# Patient Record
Sex: FEMALE | Race: WHITE | Employment: FULL TIME | URBAN - METROPOLITAN AREA
[De-identification: names, ages, dates, MRNs, and addresses within clinical notes are randomized per-mention and may not be internally consistent; named-entity substitution may affect disease eponyms.]

---

## 2017-01-23 ENCOUNTER — TELEPHONE (OUTPATIENT)
Dept: OBGYN | Facility: OTHER | Age: 37
End: 2017-01-23

## 2017-01-23 NOTE — TELEPHONE ENCOUNTER
Patient was being seen at [facility] for fertility and they confirmed her pregnancy with ultra sound and have released her to see her own OB provider now, patient will be 9 weeks on Wednesday and she is not sure when she should be seen?    Thank you Keshia

## 2017-01-30 ENCOUNTER — PRENATAL OFFICE VISIT (OUTPATIENT)
Dept: OBGYN | Facility: OTHER | Age: 37
End: 2017-01-30
Payer: COMMERCIAL

## 2017-01-30 VITALS
HEART RATE: 78 BPM | WEIGHT: 189 LBS | DIASTOLIC BLOOD PRESSURE: 60 MMHG | SYSTOLIC BLOOD PRESSURE: 84 MMHG | BODY MASS INDEX: 26.37 KG/M2

## 2017-01-30 DIAGNOSIS — Z34.91 NORMAL PREGNANCY IN FIRST TRIMESTER: Primary | ICD-10-CM

## 2017-01-30 PROBLEM — Z23 FLU VACCINE NEED: Status: ACTIVE | Noted: 2017-01-30

## 2017-01-30 PROBLEM — Z98.891 H/O: CESAREAN SECTION: Status: ACTIVE | Noted: 2017-01-30

## 2017-01-30 PROBLEM — O34.219 DECLINES VBAC (VAGINAL BIRTH AFTER CESAREAN) TRIAL: Status: ACTIVE | Noted: 2017-01-30

## 2017-01-30 PROBLEM — Z23 NEED FOR TDAP VACCINATION: Status: ACTIVE | Noted: 2017-01-30

## 2017-01-30 LAB
ALBUMIN UR-MCNC: NEGATIVE MG/DL
APPEARANCE UR: CLEAR
BASOPHILS # BLD AUTO: 0 10E9/L (ref 0–0.2)
BASOPHILS NFR BLD AUTO: 0.1 %
BILIRUB UR QL STRIP: NEGATIVE
COLOR UR AUTO: YELLOW
DIFFERENTIAL METHOD BLD: NORMAL
EOSINOPHIL # BLD AUTO: 0.1 10E9/L (ref 0–0.7)
EOSINOPHIL NFR BLD AUTO: 0.6 %
ERYTHROCYTE [DISTWIDTH] IN BLOOD BY AUTOMATED COUNT: 12.8 % (ref 10–15)
GLUCOSE UR STRIP-MCNC: NEGATIVE MG/DL
HCT VFR BLD AUTO: 37.9 % (ref 35–47)
HGB BLD-MCNC: 13.2 G/DL (ref 11.7–15.7)
HGB UR QL STRIP: ABNORMAL
KETONES UR STRIP-MCNC: NEGATIVE MG/DL
LEUKOCYTE ESTERASE UR QL STRIP: NEGATIVE
LYMPHOCYTES # BLD AUTO: 2.4 10E9/L (ref 0.8–5.3)
LYMPHOCYTES NFR BLD AUTO: 26.8 %
MCH RBC QN AUTO: 30.5 PG (ref 26.5–33)
MCHC RBC AUTO-ENTMCNC: 34.8 G/DL (ref 31.5–36.5)
MCV RBC AUTO: 88 FL (ref 78–100)
MONOCYTES # BLD AUTO: 0.4 10E9/L (ref 0–1.3)
MONOCYTES NFR BLD AUTO: 4.4 %
NEUTROPHILS # BLD AUTO: 6 10E9/L (ref 1.6–8.3)
NEUTROPHILS NFR BLD AUTO: 68.1 %
NITRATE UR QL: NEGATIVE
NON-SQ EPI CELLS #/AREA URNS LPF: ABNORMAL /LPF
PH UR STRIP: 6 PH (ref 5–7)
PLATELET # BLD AUTO: 205 10E9/L (ref 150–450)
RBC # BLD AUTO: 4.33 10E12/L (ref 3.8–5.2)
RBC #/AREA URNS AUTO: ABNORMAL /HPF (ref 0–2)
SP GR UR STRIP: 1.01 (ref 1–1.03)
URN SPEC COLLECT METH UR: ABNORMAL
UROBILINOGEN UR STRIP-ACNC: 0.2 EU/DL (ref 0.2–1)
WBC # BLD AUTO: 8.8 10E9/L (ref 4–11)
WBC #/AREA URNS AUTO: ABNORMAL /HPF (ref 0–2)

## 2017-01-30 PROCEDURE — 36415 COLL VENOUS BLD VENIPUNCTURE: CPT | Performed by: OBSTETRICS & GYNECOLOGY

## 2017-01-30 PROCEDURE — 87389 HIV-1 AG W/HIV-1&-2 AB AG IA: CPT | Performed by: OBSTETRICS & GYNECOLOGY

## 2017-01-30 PROCEDURE — 87624 HPV HI-RISK TYP POOLED RSLT: CPT | Performed by: OBSTETRICS & GYNECOLOGY

## 2017-01-30 PROCEDURE — 86780 TREPONEMA PALLIDUM: CPT | Performed by: OBSTETRICS & GYNECOLOGY

## 2017-01-30 PROCEDURE — 87340 HEPATITIS B SURFACE AG IA: CPT | Performed by: OBSTETRICS & GYNECOLOGY

## 2017-01-30 PROCEDURE — 86900 BLOOD TYPING SEROLOGIC ABO: CPT | Performed by: OBSTETRICS & GYNECOLOGY

## 2017-01-30 PROCEDURE — 86762 RUBELLA ANTIBODY: CPT | Performed by: OBSTETRICS & GYNECOLOGY

## 2017-01-30 PROCEDURE — G0145 SCR C/V CYTO,THINLAYER,RESCR: HCPCS | Performed by: OBSTETRICS & GYNECOLOGY

## 2017-01-30 PROCEDURE — 99207 ZZC FIRST OB VISIT: CPT | Performed by: OBSTETRICS & GYNECOLOGY

## 2017-01-30 PROCEDURE — 87591 N.GONORRHOEAE DNA AMP PROB: CPT | Performed by: OBSTETRICS & GYNECOLOGY

## 2017-01-30 PROCEDURE — 85025 COMPLETE CBC W/AUTO DIFF WBC: CPT | Performed by: OBSTETRICS & GYNECOLOGY

## 2017-01-30 PROCEDURE — 86850 RBC ANTIBODY SCREEN: CPT | Performed by: OBSTETRICS & GYNECOLOGY

## 2017-01-30 PROCEDURE — 81001 URINALYSIS AUTO W/SCOPE: CPT | Performed by: OBSTETRICS & GYNECOLOGY

## 2017-01-30 PROCEDURE — 86901 BLOOD TYPING SEROLOGIC RH(D): CPT | Performed by: OBSTETRICS & GYNECOLOGY

## 2017-01-30 PROCEDURE — 87491 CHLMYD TRACH DNA AMP PROBE: CPT | Performed by: OBSTETRICS & GYNECOLOGY

## 2017-01-30 ASSESSMENT — PAIN SCALES - GENERAL: PAINLEVEL: NO PAIN (0)

## 2017-01-30 NOTE — PROGRESS NOTES
"Chief Complaint   Patient presents with     Prenatal Care     folder - getting infertitlity records from [place] and Ultrasound/  Pap is due       Initial BP 84/60 mmHg  Pulse 78  Wt 189 lb (85.73 kg) Estimated body mass index is 26.37 kg/(m^2) as calculated from the following:    Height as of 3/27/14: 5' 11\" (1.803 m).    Weight as of this encounter: 189 lb (85.73 kg).  BP completed using cuff size: large  "
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
## 2017-01-31 LAB
ABO + RH BLD: NORMAL
ABO + RH BLD: NORMAL
BLD GP AB SCN SERPL QL: NORMAL
BLOOD BANK CMNT PATIENT-IMP: NORMAL
HBV SURFACE AG SERPL QL IA: NONREACTIVE
HIV 1+2 AB+HIV1 P24 AG SERPL QL IA: NORMAL
SPECIMEN EXP DATE BLD: NORMAL
T PALLIDUM IGG+IGM SER QL: NEGATIVE

## 2017-02-01 LAB
C TRACH DNA SPEC QL NAA+PROBE: NORMAL
N GONORRHOEA DNA SPEC QL NAA+PROBE: NORMAL
RUBV IGG SERPL IA-ACNC: 32 IU/ML
SPECIMEN SOURCE: NORMAL
SPECIMEN SOURCE: NORMAL

## 2017-02-02 LAB
COPATH REPORT: NORMAL
PAP: NORMAL

## 2017-02-06 LAB
FINAL DIAGNOSIS: NORMAL
HPV HR 12 DNA CVX QL NAA+PROBE: NEGATIVE
HPV16 DNA SPEC QL NAA+PROBE: NEGATIVE
HPV18 DNA SPEC QL NAA+PROBE: NEGATIVE
SPECIMEN DESCRIPTION: NORMAL

## 2017-02-10 ENCOUNTER — RADIANT APPOINTMENT (OUTPATIENT)
Dept: ULTRASOUND IMAGING | Facility: OTHER | Age: 37
End: 2017-02-10
Attending: OBSTETRICS & GYNECOLOGY
Payer: COMMERCIAL

## 2017-02-10 DIAGNOSIS — Z34.91 NORMAL PREGNANCY IN FIRST TRIMESTER: ICD-10-CM

## 2017-02-10 PROCEDURE — 76801 OB US < 14 WKS SINGLE FETUS: CPT

## 2017-03-03 ENCOUNTER — PRENATAL OFFICE VISIT (OUTPATIENT)
Dept: OBGYN | Facility: OTHER | Age: 37
End: 2017-03-03
Payer: COMMERCIAL

## 2017-03-03 VITALS
HEART RATE: 66 BPM | WEIGHT: 199 LBS | SYSTOLIC BLOOD PRESSURE: 98 MMHG | BODY MASS INDEX: 27.75 KG/M2 | DIASTOLIC BLOOD PRESSURE: 52 MMHG

## 2017-03-03 DIAGNOSIS — O34.219 DECLINES VBAC (VAGINAL BIRTH AFTER CESAREAN) TRIAL: ICD-10-CM

## 2017-03-03 DIAGNOSIS — Z34.82 NORMAL PREGNANCY IN MULTIGRAVIDA IN SECOND TRIMESTER: Primary | ICD-10-CM

## 2017-03-03 DIAGNOSIS — Z98.891 H/O: CESAREAN SECTION: ICD-10-CM

## 2017-03-03 PROCEDURE — 99207 ZZC PRENATAL VISIT: CPT | Performed by: OBSTETRICS & GYNECOLOGY

## 2017-03-03 ASSESSMENT — PAIN SCALES - GENERAL: PAINLEVEL: NO PAIN (0)

## 2017-03-03 NOTE — NURSING NOTE
"Chief Complaint   Patient presents with     Prenatal Care     14 plus 2 weeks       Initial BP 98/52  Pulse 66  Wt 199 lb (90.3 kg)  BMI 27.75 kg/m2 Estimated body mass index is 27.75 kg/(m^2) as calculated from the following:    Height as of 3/27/14: 5' 11\" (1.803 m).    Weight as of this encounter: 199 lb (90.3 kg).  Medication Reconciliation: complete     14w2d    "

## 2017-03-03 NOTE — PROGRESS NOTES
36 year old  at 14w2d weeks presents to the clinic for a routine prenatal visit.  Feeling well.  No vaginal bleeding, leakage of fluid, or contractions   Fundal height=s=d  GDXg=446's  Discussed quad screen and she declines  Anatomy ultrasound already scheduled with M for 3/31  RTC 4 weeks.    Kirstie Shields

## 2017-03-03 NOTE — MR AVS SNAPSHOT
After Visit Summary   3/3/2017    Liliana Guerar    MRN: [id number]           Patient Information     Date Of Birth          1980        Visit Information        Provider Department      3/3/2017 4:00 PM Kirstie Shields DO [facility]        Today's Diagnoses     Normal pregnancy in multigravida in second trimester    -  1    H/O:  section        Declines  (vaginal birth after ) trial          Care Instructions    Prenatal Care  What is prenatal care?   Prenatal care is the care you receive when you are pregnant. It includes care given by your healthcare provider, support from your family, and an extra focus on giving yourself the care you need during this special time. Prenatal care improves your chances for a healthy pregnancy and healthy baby.   When should I see my healthcare provider?   Good care during pregnancy includes regularly scheduled prenatal exams.   If you are not yet pregnant but planning to get pregnant in the next few months, see your provider. Your provider may do some tests and talk about things you can do to have a healthy pregnancy and healthy baby.   You should schedule your first prenatal visit with your provider as soon as you think or know you are pregnant. Depending on your health and health history, your provider will probably schedule visits at least once a month for the first 6 months. During the 7th and 8th months you will see your provider every 2 weeks. During the last month you will probably see your provider once a week until you deliver your baby. If your pregnancy is high risk, your provider will probably want to see you more often. In some cases your provider may refer you to a medical specialist for more help with special needs, such as diabetes.   At each visit your healthcare provider will check to make sure that you and the baby are healthy. Regular visits can help you and your provider prevent possible problems.  They can also help your provider find and treat any problems early. In addition to meeting your medical needs, your provider advise you about caring for yourself. You will talk about how to have a healthy diet and get plenty of exercise and rest. Your provider can also help you deal with the emotional changes that can happen during pregnancy.   What will happen at the first prenatal visit?   At your first visit, your provider will ask about your personal medical history. He or she will also ask about the baby's father and your family health history. This information can help give your provider an idea of any problems you might have during your pregnancy. You will have a physical exam, including checks of your height, weight, and blood pressure and a pelvic exam. You will have a Pap test, urine tests, blood tests, and cultures of the cervix and vagina to check for infection.   Your provider will calculate your due date and the age of your baby. If your periods were regular before you got pregnant, and you are sure of the day when your last period started, your due date will be estimated to be 40 weeks from that day.   Your provider will talk to you about how to stay healthy during your pregnancy.   What will happen at other prenatal visits?   Your provider will check how you are doing and how the baby is developing. He or she will discuss how you are feeling, ask if you have any problems, and answer your questions.   During each prenatal visit your provider will:   weigh you   take your blood pressure   check your urine for sugar, protein, or bacteria   check your face, hands, ankles, and feet for swelling   listen to the baby's heartbeat   measure the size of the uterus to check the baby's growth.   At different times during the pregnancy, other exams and tests may be done. Some are routine and others are done only when a problem is suspected or you have a risk factor for a problem. Examples of other tests you might  have are:   tests to check for genetic problems and some birth defects, such as:   chorionic villus sampling of cells from the placenta   amniocentesis to test the fluid around the baby   blood tests called triple or quad screens   ultrasound scans to check the baby's growth, development, and health and to look at your uterus, the amniotic sac, and the placenta   blood tests to check for diabetes   electronic monitoring to check the health of the baby.   How can I take care of myself during my pregnancy?   Here are some things you can do to take good care of yourself during your pregnancy and prepare for the birth of your child:   Keep all appointments with your healthcare provider. Use these visits to discuss your pregnancy concerns or problems. Write down questions before each visit so that you will not forget about things you want to talk about.   Eat healthy meals that include whole grains, fresh fruits and vegetables, and calcium-rich foods, such as milk, cheese, and yogurt. Choose foods low in saturated fat. Do not eat uncooked or undercooked meats or fish.   Avoid certain fish with high levels of mercury. These fish include shark, sophie mackerel, swordfish, and tilefish. Do not eat more than 12 ounces of fish per week, or no more than 6 ounces of tuna fish per week. Because albacore tuna fish is also high in mercury, choose light tuna.   Drink plenty of water each day.   Take vitamins, other supplements, and medicines as recommended by your provider.   Unless your healthcare provider tells you not to, try to be physically active for at least 30 minutes a day, most days of the week. If you are pressed for time, you might find it easier to exercise 10 minutes at a time, 3 times a day. Consider taking a prenatal exercise class.   Do not smoke, do not drink alcohol, and do not take illegal drugs.   Talk to your healthcare provider before you take any medicine, including nonprescription and herbal medicines. Some  medicines are not safe during pregnancy.   Avoid hot tubs or saunas.   If you have cats in your home, do not empty the cat litter while you are pregnant. It may contain a parasite that causes an infection called toxoplasmosis, which can cause birth defects. Also, use gloves when you work in garden areas used by cats.   Stay away from toxic chemicals like insecticides, solvents (such as some  or paint thinners), lead, and mercury. Check labels on household products. Most dangerous products have pregnancy warnings on their labels. Ask your healthcare provider about products if you are unsure.   Relax by taking breaks from work or chores.   Help reduce stress by sharing your feelings with others.   Report any violence or other types of abuse in your home.   Learn more about pregnancy, labor, and delivery. Read books, watch videos, go to a childbirth class, and talk with experienced moms.   Plan for the lifestyle changes a new baby will bring. Prepare for possible changes in your budget, work situation, daily schedule, and relationships with family and friends.   Talk to your provider about the pros and cons of breast-feeding.   Before and during your pregnancy, try to do everything you can to keep yourself and your baby healthy during your pregnancy.     Published by [employer].  This content is reviewed periodically and is subject to change as new health information becomes available. The information is intended to inform and educate and is not a replacement for medical evaluation, advice, diagnosis or treatment by a healthcare professional.   Developed by [employer].   ? 2010 [facility] and/or its affiliates. All Rights Reserved.   Copyright   Clinical Reference Systems 2011            Follow-ups after your visit        Follow-up notes from your care team     Return in about 4 weeks (around 3/31/2017).      Who to contact     If you have questions or need follow up information about today's clinic visit or  your schedule please contact [facility] directly at [phone].  Normal or non-critical lab and imaging results will be communicated to you by MyChart, letter or phone within 4 business days after the clinic has received the results. If you do not hear from us within 7 days, please contact the clinic through [employer] or phone. If you have a critical or abnormal lab result, we will notify you by phone as soon as possible.  Submit refill requests through [employer] or call your pharmacy and they will forward the refill request to us. Please allow 3 business days for your refill to be completed.          Additional Information About Your Visit        [employer] Information     [employer] gives you secure access to your electronic health record. If you see a primary care provider, you can also send messages to your care team and make appointments. If you have questions, please call your primary care clinic.  If you do not have a primary care provider, please call [phone] and they will assist you.        Care EveryWhere ID     This is your Care EveryWhere ID. This could be used by other organizations to access your [facility] medical records  [id number]        Your Vitals Were     Pulse BMI (Body Mass Index)                66 27.75 kg/m2           Blood Pressure from Last 3 Encounters:   03/03/17 98/52   01/30/17 (!) 84/60   03/05/15 119/73    Weight from Last 3 Encounters:   03/03/17 199 lb (90.3 kg)   01/30/17 189 lb (85.7 kg)   03/05/15 205 lb 6.4 oz (93.2 kg)              Today, you had the following     No orders found for display       Primary Care Provider    None Specified       No primary provider on file.        Thank you!     Thank you for choosing [facility]  for your care. Our goal is always to provide you with excellent care. Hearing back from our patients is one way we can continue to improve our services. Please take a few minutes to complete the written survey that you may  receive in the mail after your visit with us. Thank you!             Your Updated Medication List - Protect others around you: Learn how to safely use, store and throw away your medicines at www.disposemymeds.org.          This list is accurate as of: 3/3/17  4:40 PM.  Always use your most recent med list.                   Brand Name Dispense Instructions for use    PRENATAL AD PO      Take 1 tablet by mouth

## 2017-03-03 NOTE — PATIENT INSTRUCTIONS
Prenatal Care  What is prenatal care?   Prenatal care is the care you receive when you are pregnant. It includes care given by your healthcare provider, support from your family, and an extra focus on giving yourself the care you need during this special time. Prenatal care improves your chances for a healthy pregnancy and healthy baby.   When should I see my healthcare provider?   Good care during pregnancy includes regularly scheduled prenatal exams.   If you are not yet pregnant but planning to get pregnant in the next few months, see your provider. Your provider may do some tests and talk about things you can do to have a healthy pregnancy and healthy baby.   You should schedule your first prenatal visit with your provider as soon as you think or know you are pregnant. Depending on your health and health history, your provider will probably schedule visits at least once a month for the first 6 months. During the 7th and 8th months you will see your provider every 2 weeks. During the last month you will probably see your provider once a week until you deliver your baby. If your pregnancy is high risk, your provider will probably want to see you more often. In some cases your provider may refer you to a medical specialist for more help with special needs, such as diabetes.   At each visit your healthcare provider will check to make sure that you and the baby are healthy. Regular visits can help you and your provider prevent possible problems. They can also help your provider find and treat any problems early. In addition to meeting your medical needs, your provider advise you about caring for yourself. You will talk about how to have a healthy diet and get plenty of exercise and rest. Your provider can also help you deal with the emotional changes that can happen during pregnancy.   What will happen at the first prenatal visit?   At your first visit, your provider will ask about your personal medical history. He  or she will also ask about the baby's father and your family health history. This information can help give your provider an idea of any problems you might have during your pregnancy. You will have a physical exam, including checks of your height, weight, and blood pressure and a pelvic exam. You will have a Pap test, urine tests, blood tests, and cultures of the cervix and vagina to check for infection.   Your provider will calculate your due date and the age of your baby. If your periods were regular before you got pregnant, and you are sure of the day when your last period started, your due date will be estimated to be 40 weeks from that day.   Your provider will talk to you about how to stay healthy during your pregnancy.   What will happen at other prenatal visits?   Your provider will check how you are doing and how the baby is developing. He or she will discuss how you are feeling, ask if you have any problems, and answer your questions.   During each prenatal visit your provider will:   weigh you   take your blood pressure   check your urine for sugar, protein, or bacteria   check your face, hands, ankles, and feet for swelling   listen to the baby's heartbeat   measure the size of the uterus to check the baby's growth.   At different times during the pregnancy, other exams and tests may be done. Some are routine and others are done only when a problem is suspected or you have a risk factor for a problem. Examples of other tests you might have are:   tests to check for genetic problems and some birth defects, such as:   chorionic villus sampling of cells from the placenta   amniocentesis to test the fluid around the baby   blood tests called triple or quad screens   ultrasound scans to check the baby's growth, development, and health and to look at your uterus, the amniotic sac, and the placenta   blood tests to check for diabetes   electronic monitoring to check the health of the baby.   How can I take care  of myself during my pregnancy?   Here are some things you can do to take good care of yourself during your pregnancy and prepare for the birth of your child:   Keep all appointments with your healthcare provider. Use these visits to discuss your pregnancy concerns or problems. Write down questions before each visit so that you will not forget about things you want to talk about.   Eat healthy meals that include whole grains, fresh fruits and vegetables, and calcium-rich foods, such as milk, cheese, and yogurt. Choose foods low in saturated fat. Do not eat uncooked or undercooked meats or fish.   Avoid certain fish with high levels of mercury. These fish include shark, sophie mackerel, swordfish, and tilefish. Do not eat more than 12 ounces of fish per week, or no more than 6 ounces of tuna fish per week. Because albacore tuna fish is also high in mercury, choose light tuna.   Drink plenty of water each day.   Take vitamins, other supplements, and medicines as recommended by your provider.   Unless your healthcare provider tells you not to, try to be physically active for at least 30 minutes a day, most days of the week. If you are pressed for time, you might find it easier to exercise 10 minutes at a time, 3 times a day. Consider taking a prenatal exercise class.   Do not smoke, do not drink alcohol, and do not take illegal drugs.   Talk to your healthcare provider before you take any medicine, including nonprescription and herbal medicines. Some medicines are not safe during pregnancy.   Avoid hot tubs or saunas.   If you have cats in your home, do not empty the cat litter while you are pregnant. It may contain a parasite that causes an infection called toxoplasmosis, which can cause birth defects. Also, use gloves when you work in garden areas used by cats.   Stay away from toxic chemicals like insecticides, solvents (such as some  or paint thinners), lead, and mercury. Check labels on household products.  Most dangerous products have pregnancy warnings on their labels. Ask your healthcare provider about products if you are unsure.   Relax by taking breaks from work or chores.   Help reduce stress by sharing your feelings with others.   Report any violence or other types of abuse in your home.   Learn more about pregnancy, labor, and delivery. Read books, watch videos, go to a childbirth class, and talk with experienced moms.   Plan for the lifestyle changes a new baby will bring. Prepare for possible changes in your budget, work situation, daily schedule, and relationships with family and friends.   Talk to your provider about the pros and cons of breast-feeding.   Before and during your pregnancy, try to do everything you can to keep yourself and your baby healthy during your pregnancy.     Published by [employer].  This content is reviewed periodically and is subject to change as new health information becomes available. The information is intended to inform and educate and is not a replacement for medical evaluation, advice, diagnosis or treatment by a healthcare professional.   Developed by [employer].   ? 2010 [employer] and/or its affiliates. All Rights Reserved.   Copyright   Clinical Reference Systems 2011

## 2017-03-31 ENCOUNTER — TRANSFERRED RECORDS (OUTPATIENT)
Dept: HEALTH INFORMATION MANAGEMENT | Facility: CLINIC | Age: 37
End: 2017-03-31

## 2017-04-05 PROBLEM — O09.522 ELDERLY MULTIGRAVIDA IN SECOND TRIMESTER: Status: ACTIVE | Noted: 2017-04-05

## 2017-04-18 ENCOUNTER — PRENATAL OFFICE VISIT (OUTPATIENT)
Dept: OBGYN | Facility: OTHER | Age: 37
End: 2017-04-18
Payer: COMMERCIAL

## 2017-04-18 VITALS
WEIGHT: 204.25 LBS | BODY MASS INDEX: 28.49 KG/M2 | SYSTOLIC BLOOD PRESSURE: 100 MMHG | HEART RATE: 68 BPM | DIASTOLIC BLOOD PRESSURE: 58 MMHG

## 2017-04-18 DIAGNOSIS — Z98.891 H/O: CESAREAN SECTION: ICD-10-CM

## 2017-04-18 DIAGNOSIS — Z23 NEED FOR TDAP VACCINATION: ICD-10-CM

## 2017-04-18 DIAGNOSIS — O34.219 DECLINES VBAC (VAGINAL BIRTH AFTER CESAREAN) TRIAL: ICD-10-CM

## 2017-04-18 DIAGNOSIS — O09.522 ELDERLY MULTIGRAVIDA IN SECOND TRIMESTER: ICD-10-CM

## 2017-04-18 PROCEDURE — 99207 ZZC PRENATAL VISIT: CPT | Performed by: ADVANCED PRACTICE MIDWIFE

## 2017-04-18 NOTE — PATIENT INSTRUCTIONS
GESTATIONAL DIABETES SCREENING    All pregnant women are screened at least once for diabetes as part of their prenatal care.  A woman has gestational diabetes if she has high blood sugars for the first time during pregnancy.  Diabetes can harm your health and the health of your baby.  But if we find the diabetes early in pregnancy and we watch your health closely, we can prevent problems.   We will check for diabetes between your 24 and 28 week visit.   Please note you can not do this prior to 24 weeks of gestation.    Please schedule this test between 8 AM and 11 AM or 1 PM and 3:45 PM.  On Monday and Thursday you may schedule your appointment up to 5:45PM in [place] and on Monday's in [place] until 5:45 PM    Plan to spend an hour at the clinic.  When you check in let us know that you will be having your diabetes screening that day.   We will give you a sweet drink and one hour later will draw blood from your arm to check your blood sugar.   We will call you to let you know if your results are normal.  If the results are normal no more testing will be needed.  If your results are not normal we will discuss follow up testing with you.    You may eat prior to the testing but it is not recommended to eat or drink very sweet things such as pop, juice, candy or dessert type foods.   If you have any questions please call:  [place] [phone]      SIGNS OF  LABOR    Labor is  if it happens more than three weeks before your due date.    It can be hard to know if you are in labor, since the symptoms can be like the normal feelings of pregnancy.  Often, the only difference is the symptoms increase or they don't go away.     Signs of  labor can include:    Change in your vaginal discharge:  You will have more vaginal discharge when you are pregnant and it should be creamy white.  Call the clinic right away if your discharge has a foul odor, pink or bloody,  or if it becomes watery or is more than  is normal for you during your pregnancy.    More than 5-6 contractions or tightenings per hour.  Contractions can feel like period cramps or bowel (gas or diarrhea) pain.  You will feel it in the lower part of your abdomen, in your back or as a pressure feeling in your bottom.  It is often regular, coming for 30 seconds or a minute and then going away, only to come back 5 or 10 minutes later. Some contractions are normal during pregnancy, but if you are feeling more than 5-6 in one hour, empty your bladder, then drink 16-24 ounces of water, eat a snack and lay down on your left side. Put your hand on your abdomen to count the contractions.  If after one hour of resting you have still had 5-6 contractions call your clinic.

## 2017-04-18 NOTE — NURSING NOTE
"Chief Complaint   Patient presents with     Prenatal Care       Initial /58 (BP Location: Left arm, Patient Position: Chair, Cuff Size: Adult Regular)  Pulse 68  Wt 204 lb 4 oz (92.6 kg)  BMI 28.49 kg/m2 Estimated body mass index is 28.49 kg/(m^2) as calculated from the following:    Height as of 3/27/14: 5' 11\" (1.803 m).    Weight as of this encounter: 204 lb 4 oz (92.6 kg).  Medication Reconciliation: complete   Loren Mann CMA      "

## 2017-04-18 NOTE — PROGRESS NOTES
Feeling well other than a couple of musculo-skeletal complaints.   Reviewed GCT for next visit.   Discussed PTL.  Will breast feed.   Has echo scheduled.   RTC 4 weeks.  Mary Astorga, MAURIZIO, CNM

## 2017-04-18 NOTE — MR AVS SNAPSHOT
After Visit Summary   2017    Liliana Guerra    MRN: [id number]           Patient Information     Date Of Birth          1980        Visit Information        Provider Department      2017 8:15 AM Mary Mendez APRN CNM [facility]        Today's Diagnoses     Elderly multigravida in second trimester        Need for Tdap vaccination        H/O:  section        Declines  (vaginal birth after ) trial          Care Instructions    GESTATIONAL DIABETES SCREENING    All pregnant women are screened at least once for diabetes as part of their prenatal care.  A woman has gestational diabetes if she has high blood sugars for the first time during pregnancy.  Diabetes can harm your health and the health of your baby.  But if we find the diabetes early in pregnancy and we watch your health closely, we can prevent problems.   We will check for diabetes between your 24 and 28 week visit.   Please note you can not do this prior to 24 weeks of gestation.    Please schedule this test between 8 AM and 11 AM or 1 PM and 3:45 PM.  On Monday and Thursday you may schedule your appointment up to 5:45PM in [place] and on  in [place] until 5:45 PM    Plan to spend an hour at the clinic.  When you check in let us know that you will be having your diabetes screening that day.   We will give you a sweet drink and one hour later will draw blood from your arm to check your blood sugar.   We will call you to let you know if your results are normal.  If the results are normal no more testing will be needed.  If your results are not normal we will discuss follow up testing with you.    You may eat prior to the testing but it is not recommended to eat or drink very sweet things such as pop, juice, candy or dessert type foods.   If you have any questions please call:  [place] [phone]      SIGNS OF  LABOR    Labor is  if it happens more than three  weeks before your due date.    It can be hard to know if you are in labor, since the symptoms can be like the normal feelings of pregnancy.  Often, the only difference is the symptoms increase or they don't go away.     Signs of  labor can include:    Change in your vaginal discharge:  You will have more vaginal discharge when you are pregnant and it should be creamy white.  Call the clinic right away if your discharge has a foul odor, pink or bloody,  or if it becomes watery or is more than is normal for you during your pregnancy.    More than 5-6 contractions or tightenings per hour.  Contractions can feel like period cramps or bowel (gas or diarrhea) pain.  You will feel it in the lower part of your abdomen, in your back or as a pressure feeling in your bottom.  It is often regular, coming for 30 seconds or a minute and then going away, only to come back 5 or 10 minutes later. Some contractions are normal during pregnancy, but if you are feeling more than 5-6 in one hour, empty your bladder, then drink 16-24 ounces of water, eat a snack and lay down on your left side. Put your hand on your abdomen to count the contractions.  If after one hour of resting you have still had 5-6 contractions call your clinic.              Follow-ups after your visit        Follow-up notes from your care team     Return in about 1 month (around 2017).      Who to contact     If you have questions or need follow up information about today's clinic visit or your schedule please contact [facility] directly at [phone].  Normal or non-critical lab and imaging results will be communicated to you by MyChart, letter or phone within 4 business days after the clinic has received the results. If you do not hear from us within 7 days, please contact the clinic through MyChart or phone. If you have a critical or abnormal lab result, we will notify you by phone as soon as possible.  Submit refill requests through  [employer] or call your pharmacy and they will forward the refill request to us. Please allow 3 business days for your refill to be completed.          Additional Information About Your Visit        [employer] Information     Rollad gives you secure access to your electronic health record. If you see a primary care provider, you can also send messages to your care team and make appointments. If you have questions, please call your primary care clinic.  If you do not have a primary care provider, please call [phone] and they will assist you.        Care EveryWhere ID     This is your Care EveryWhere ID. This could be used by other organizations to access your [place] medical records  [id number]        Your Vitals Were     Pulse BMI (Body Mass Index)                68 28.49 kg/m2           Blood Pressure from Last 3 Encounters:   04/18/17 100/58   03/03/17 98/52   01/30/17 (!) 84/60    Weight from Last 3 Encounters:   04/18/17 204 lb 4 oz (92.6 kg)   03/03/17 199 lb (90.3 kg)   01/30/17 189 lb (85.7 kg)              Today, you had the following     No orders found for display       Primary Care Provider    None Specified       No primary provider on file.        Thank you!     Thank you for choosing [facility]  for your care. Our goal is always to provide you with excellent care. Hearing back from our patients is one way we can continue to improve our services. Please take a few minutes to complete the written survey that you may receive in the mail after your visit with us. Thank you!             Your Updated Medication List - Protect others around you: Learn how to safely use, store and throw away your medicines at www.disposemymeds.org.          This list is accurate as of: 4/18/17  9:13 AM.  Always use your most recent med list.                   Brand Name Dispense Instructions for use    PRENATAL AD PO      Take 1 tablet by mouth

## 2017-04-25 ENCOUNTER — TRANSFERRED RECORDS (OUTPATIENT)
Dept: HEALTH INFORMATION MANAGEMENT | Facility: CLINIC | Age: 37
End: 2017-04-25

## 2017-05-15 ENCOUNTER — PRENATAL OFFICE VISIT (OUTPATIENT)
Dept: OBGYN | Facility: OTHER | Age: 37
End: 2017-05-15
Payer: COMMERCIAL

## 2017-05-15 VITALS
WEIGHT: 209 LBS | SYSTOLIC BLOOD PRESSURE: 100 MMHG | HEART RATE: 76 BPM | DIASTOLIC BLOOD PRESSURE: 50 MMHG | BODY MASS INDEX: 29.15 KG/M2

## 2017-05-15 DIAGNOSIS — O09.522 ELDERLY MULTIGRAVIDA IN SECOND TRIMESTER: Primary | ICD-10-CM

## 2017-05-15 DIAGNOSIS — Z98.891 H/O: CESAREAN SECTION: ICD-10-CM

## 2017-05-15 LAB
GLUCOSE 1H P 50 G GLC PO SERPL-MCNC: 121 MG/DL (ref 60–129)
HGB BLD-MCNC: 11.8 G/DL (ref 11.7–15.7)

## 2017-05-15 PROCEDURE — 36415 COLL VENOUS BLD VENIPUNCTURE: CPT | Performed by: OBSTETRICS & GYNECOLOGY

## 2017-05-15 PROCEDURE — 82950 GLUCOSE TEST: CPT | Performed by: OBSTETRICS & GYNECOLOGY

## 2017-05-15 PROCEDURE — 99207 ZZC PRENATAL VISIT: CPT | Performed by: OBSTETRICS & GYNECOLOGY

## 2017-05-15 PROCEDURE — 00000218 ZZHCL STATISTIC OBHBG - HEMOGLOBIN: Performed by: OBSTETRICS & GYNECOLOGY

## 2017-05-15 ASSESSMENT — PAIN SCALES - GENERAL: PAINLEVEL: NO PAIN (0)

## 2017-05-15 NOTE — PROGRESS NOTES
36 year old  at 24w5d weeks presents to the clinic for a routine prenatal visit.  No concerns.  No vaginal bleeding, leakage of fluid, or contractions   Good fetal movement.  FHTs= 155  Fundal height= 25cm    Normal anatomy ultrasound  RTC 4 weeks  GCT today  Blood type:A+    Kirstie Shields

## 2017-05-15 NOTE — MR AVS SNAPSHOT
After Visit Summary   5/15/2017    Liliana Guerra    MRN: [id number]           Patient Information     Date Of Birth          1980        Visit Information        Provider Department      5/15/2017 7:30 AM Kirstie Shields,  [facility]        Today's Diagnoses     Elderly multigravida in second trimester    -  1    H/O:  section          Care Instructions    What to watch out for are: regular contractions every 5 min, vaginal bleeding, decreased fetal movement, or leakage of fluid.  Please call the office or go to L&D if you develop any of these signs and symptoms.      I will see you for your follow up appointment.  Please feel free to call if you have any questions or concerns.      Thanks,  Kirstie Shields, DO          Follow-ups after your visit        Follow-up notes from your care team     Return in about 4 weeks (around 2017).      Who to contact     If you have questions or need follow up information about today's clinic visit or your schedule please contact [facility] directly at [phone].  Normal or non-critical lab and imaging results will be communicated to you by "[facility]"hart, letter or phone within 4 business days after the clinic has received the results. If you do not hear from us within 7 days, please contact the clinic through "[facility]"hart or phone. If you have a critical or abnormal lab result, we will notify you by phone as soon as possible.  Submit refill requests through [employer] or call your pharmacy and they will forward the refill request to us. Please allow 3 business days for your refill to be completed.          Additional Information About Your Visit        "[facility]"hart Information     [employer] gives you secure access to your electronic health record. If you see a primary care provider, you can also send messages to your care team and make appointments. If you have questions, please call your primary care clinic.  If you do not  have a primary care provider, please call [phone] and they will assist you.        Care EveryWhere ID     This is your Care EveryWhere ID. This could be used by other organizations to access your [place] medical records  [id number]        Your Vitals Were     Pulse BMI (Body Mass Index)                76 29.15 kg/m2           Blood Pressure from Last 3 Encounters:   05/15/17 100/50   04/18/17 100/58   03/03/17 98/52    Weight from Last 3 Encounters:   05/15/17 209 lb (94.8 kg)   04/18/17 204 lb 4 oz (92.6 kg)   03/03/17 199 lb (90.3 kg)              We Performed the Following     Glucose tolerance, gest screen, 1 hour     OB hemoglobin        Primary Care Provider    None Specified       No primary provider on file.        Thank you!     Thank you for choosing [facility]  for your care. Our goal is always to provide you with excellent care. Hearing back from our patients is one way we can continue to improve our services. Please take a few minutes to complete the written survey that you may receive in the mail after your visit with us. Thank you!             Your Updated Medication List - Protect others around you: Learn how to safely use, store and throw away your medicines at www.disposemymeds.org.          This list is accurate as of: 5/15/17  7:46 AM.  Always use your most recent med list.                   Brand Name Dispense Instructions for use    PRENATAL AD PO      Take 1 tablet by mouth

## 2017-05-15 NOTE — PATIENT INSTRUCTIONS
What to watch out for are: regular contractions every 5 min, vaginal bleeding, decreased fetal movement, or leakage of fluid.  Please call the office or go to L&D if you develop any of these signs and symptoms.      I will see you for your follow up appointment.  Please feel free to call if you have any questions or concerns.      Thanks,  Kirstie Shields, DO

## 2017-05-15 NOTE — NURSING NOTE
"Chief Complaint   Patient presents with     Prenatal Care       Initial /50  Pulse 76  Wt 209 lb (94.8 kg)  BMI 29.15 kg/m2 Estimated body mass index is 29.15 kg/(m^2) as calculated from the following:    Height as of 3/27/14: 5' 11\" (1.803 m).    Weight as of this encounter: 209 lb (94.8 kg).  Medication Reconciliation: complete       24w5d  GCT today  "

## 2017-06-19 ENCOUNTER — MYC MEDICAL ADVICE (OUTPATIENT)
Dept: OBGYN | Facility: OTHER | Age: 37
End: 2017-06-19

## 2017-06-19 ENCOUNTER — TELEPHONE (OUTPATIENT)
Dept: OBGYN | Facility: OTHER | Age: 37
End: 2017-06-19

## 2017-06-19 ENCOUNTER — PRENATAL OFFICE VISIT (OUTPATIENT)
Dept: OBGYN | Facility: OTHER | Age: 37
End: 2017-06-19
Payer: COMMERCIAL

## 2017-06-19 VITALS
SYSTOLIC BLOOD PRESSURE: 84 MMHG | WEIGHT: 216 LBS | HEART RATE: 96 BPM | BODY MASS INDEX: 30.13 KG/M2 | DIASTOLIC BLOOD PRESSURE: 50 MMHG

## 2017-06-19 DIAGNOSIS — K21.9 GASTROESOPHAGEAL REFLUX DISEASE WITHOUT ESOPHAGITIS: ICD-10-CM

## 2017-06-19 DIAGNOSIS — Z23 NEED FOR VACCINATION: ICD-10-CM

## 2017-06-19 DIAGNOSIS — Z34.93 NORMAL PREGNANCY IN THIRD TRIMESTER: Primary | ICD-10-CM

## 2017-06-19 DIAGNOSIS — Z23 NEED FOR TDAP VACCINATION: ICD-10-CM

## 2017-06-19 PROCEDURE — 90471 IMMUNIZATION ADMIN: CPT | Performed by: OBSTETRICS & GYNECOLOGY

## 2017-06-19 PROCEDURE — 99207 ZZC PRENATAL VISIT: CPT | Performed by: OBSTETRICS & GYNECOLOGY

## 2017-06-19 PROCEDURE — 90715 TDAP VACCINE 7 YRS/> IM: CPT | Performed by: OBSTETRICS & GYNECOLOGY

## 2017-06-19 ASSESSMENT — PAIN SCALES - GENERAL: PAINLEVEL: NO PAIN (0)

## 2017-06-19 NOTE — LETTER
[address] [address]  Phone: [phone]    June 19, 2017        Liliana Guerra  [address] [address] [place] [id number]          To whom it may concern:    RE: Liliana Guerra    Patient was seen and treated today at our clinic.  Please allow patient to be able to work from home as needed.        Please contact me for questions or concerns.      Sincerely,        Kirstie Shields, DO

## 2017-06-19 NOTE — PROGRESS NOTES
36 year old  at 29w5d weeks presents to the clinic for a routine prenatal visit.  Patient denies any vaginal bleeding, leakage of fluid, or contractions   Good fetal movement.    Fundal height=30cm  RJIv=055's  OUY=526  Hgb=11.8  TDAP=today  RCS=will try for  or   GERD=will order Zantac  RTC 2 weeks    Kirstie Shields

## 2017-06-19 NOTE — NURSING NOTE
"Chief Complaint   Patient presents with     Prenatal Care     T-dap       Initial BP (!) 84/50 (BP Location: Left arm)  Pulse 96  Wt 216 lb (98 kg)  BMI 30.13 kg/m2 Estimated body mass index is 30.13 kg/(m^2) as calculated from the following:    Height as of 3/27/14: 5' 11\" (1.803 m).    Weight as of this encounter: 216 lb (98 kg).  Medication Reconciliation: complete     29w5d      Screening Questionnaire for Adult Immunization    Are you sick today?   No   Do you have allergies to medications, food, a vaccine component or latex?   No   Have you ever had a serious reaction after receiving a vaccination?   No   Do you have a long-term health problem with heart disease, lung disease, asthma, kidney disease, metabolic disease (e.g. diabetes), anemia, or other blood disorder?   No   Do you have cancer, leukemia, HIV/AIDS, or any other immune system problem?   No   In the past 3 months, have you taken medications that affect  your immune system, such as prednisone, other steroids, or anticancer drugs; drugs for the treatment of rheumatoid arthritis, Crohn s disease, or psoriasis; or have you had radiation treatments?   No   Have you had a seizure, or a brain or other nervous system problem?   No   During the past year, have you received a transfusion of blood or blood     products, or been given immune (gamma) globulin or antiviral drug?   No   For women: Are you pregnant or is there a chance you could become        pregnant during the next month?   Yes   Have you received any vaccinations in the past 4 weeks?   No     Immunization questionnaire .      MNVFC doesn't apply on this patient    Per orders of Dr. Shields, injection of TDAP given by Ernestine Trivedi. Patient instructed to remain in clinic for 20 minutes afterwards, and to report any adverse reaction to me immediately.       Screening performed by Ernestine Trivedi on 6/19/2017 at 7:40 AM.    "

## 2017-06-19 NOTE — MR AVS SNAPSHOT
After Visit Summary   6/19/2017    Liliana Guerra    MRN: [id number]           Patient Information     Date Of Birth          1980        Visit Information        Provider Department      6/19/2017 7:30 AM Kirstie Shields,  [facility]        Today's Diagnoses     Normal pregnancy in third trimester    -  1    Gastroesophageal reflux disease without esophagitis        Need for Tdap vaccination          Care Instructions    What to watch out for are: regular contractions every 5 min, vaginal bleeding, decreased fetal movement, or leakage of fluid.  Please call the office or go to L&D if you develop any of these signs and symptoms.      I will see you for your follow up appointment.  Please feel free to call if you have any questions or concerns.      Thanks,  Kirstie Shields, DO            Follow-ups after your visit        Follow-up notes from your care team     Return in about 2 weeks (around 7/3/2017).      Who to contact     If you have questions or need follow up information about today's clinic visit or your schedule please contact [facility] directly at [phone].  Normal or non-critical lab and imaging results will be communicated to you by MyChart, letter or phone within 4 business days after the clinic has received the results. If you do not hear from us within 7 days, please contact the clinic through [employer] or phone. If you have a critical or abnormal lab result, we will notify you by phone as soon as possible.  Submit refill requests through [employer] or call your pharmacy and they will forward the refill request to us. Please allow 3 business days for your refill to be completed.          Additional Information About Your Visit        MyChart Information     [employer] gives you secure access to your electronic health record. If you see a primary care provider, you can also send messages to your care team and make appointments. If you have  questions, please call your primary care clinic.  If you do not have a primary care provider, please call [phone] and they will assist you.        Care EveryWhere ID     This is your Care EveryWhere ID. This could be used by other organizations to access your [place] medical records  [id number]        Your Vitals Were     Pulse BMI (Body Mass Index)                96 30.13 kg/m2           Blood Pressure from Last 3 Encounters:   06/19/17 (!) 84/50   05/15/17 100/50   04/18/17 100/58    Weight from Last 3 Encounters:   06/19/17 216 lb (98 kg)   05/15/17 209 lb (94.8 kg)   04/18/17 204 lb 4 oz (92.6 kg)              Today, you had the following     No orders found for display         Today's Medication Changes          These changes are accurate as of: 6/19/17  8:10 AM.  If you have any questions, ask your nurse or doctor.               Start taking these medicines.        Dose/Directions    ranitidine 150 MG tablet   Commonly known as:  ZANTAC   Used for:  Gastroesophageal reflux disease without esophagitis   Started by:  Kirstie Shields DO        Dose:  150 mg   Take 1 tablet (150 mg) by mouth 2 times daily   Quantity:  60 tablet   Refills:  1            Where to get your medicines      These medications were sent to [place] [id number] [place] [facility] [address] [address] [place] [address]  [address] [address] [place] [address]     Phone:  [phone]     ranitidine 150 MG tablet                Primary Care Provider    None Specified       No primary provider on file.        Thank you!     Thank you for choosing [facility]  for your care. Our goal is always to provide you with excellent care. Hearing back from our patients is one way we can continue to improve our services. Please take a few minutes to complete the written survey that you may receive in the mail after your visit with us. Thank you!             Your Updated Medication List - Protect others around you: Learn how to safely use, store and throw  away your medicines at www.disposemymeds.org.          This list is accurate as of: 6/19/17  8:10 AM.  Always use your most recent med list.                   Brand Name Dispense Instructions for use    PRENATAL AD PO      Take 1 tablet by mouth       ranitidine 150 MG tablet    ZANTAC    60 tablet    Take 1 tablet (150 mg) by mouth 2 times daily

## 2017-07-06 ENCOUNTER — PRENATAL OFFICE VISIT (OUTPATIENT)
Dept: OBGYN | Facility: OTHER | Age: 37
End: 2017-07-06
Payer: COMMERCIAL

## 2017-07-06 VITALS
WEIGHT: 219.5 LBS | SYSTOLIC BLOOD PRESSURE: 104 MMHG | BODY MASS INDEX: 30.61 KG/M2 | HEART RATE: 68 BPM | DIASTOLIC BLOOD PRESSURE: 60 MMHG

## 2017-07-06 DIAGNOSIS — O09.523 AMA (ADVANCED MATERNAL AGE) MULTIGRAVIDA 35+, THIRD TRIMESTER: Primary | ICD-10-CM

## 2017-07-06 DIAGNOSIS — Z98.891 H/O: CESAREAN SECTION: ICD-10-CM

## 2017-07-06 DIAGNOSIS — O34.219 DECLINES VBAC (VAGINAL BIRTH AFTER CESAREAN) TRIAL: ICD-10-CM

## 2017-07-06 PROBLEM — Z23 NEED FOR TDAP VACCINATION: Status: RESOLVED | Noted: 2017-01-30 | Resolved: 2017-07-06

## 2017-07-06 PROCEDURE — 99207 ZZC PRENATAL VISIT: CPT | Performed by: ADVANCED PRACTICE MIDWIFE

## 2017-07-06 NOTE — MR AVS SNAPSHOT
After Visit Summary   2017    Liliana Guerra    MRN: [id number]           Patient Information     Date Of Birth          1980        Visit Information        Provider Department      2017 3:00 PM Mary Mendez APRN [facility]        Today's Diagnoses     AMA (advanced maternal age) multigravida 36+, third trimester    -  1    H/O:  section        Declines  (vaginal birth after ) trial          Care Instructions    Thank for choosing our clinic for your health care needs. We aim to provided you with excellent care. One way that we continue to improve our care is by listening to our patients. Please take a few minutes to complete the written survey that you may receive in the mail after your visit.     You may reach your care team by calling the following numbers:    [place]- [phone]   For clinic hours at [facility]  please visit the [place] web site [email or web address]    Notification of your lab results:  Normal or non-critical lab and imaging results will be communicated to you by Mychart, letter, or phone within 7 days. If you do not hear from us within 10 days, please contact us through Dogihart or phone. If you have a critical or abnormal lab result, we will notify you by phone as soon as possible.      After Hours nurse advice:  [place] Nurse Advisors:  [phone]     Medication Refills:  Please contact your pharmacy and they will forward the refill to us. Please allow 3 business days for your refills to be completed.     Secure access to your medical record:  Use InvisibleCRMt (secure email communication and access to your chart) to send your primary care provider a message or make an appointment. Ask someone on your Team how to sign up for Right90. To log on to [employer] or for more information in Right90 please visit the website at [email or web address].      OBGYN Care Team               Mary Astorga [facility]  hours: Tuesday 8-5 , Thursday 1145-7 and every other Friday 8-5 at [place]   Wednesday 8-5 at Andriy Shields DO  Clinic hours 7-6 Monday at [place]  8-5 Tuesdays at [place]  7-12 Fridays [facility]  1-5 Fridays at [place]    Dalia Moreno MD  Clinic hours: [place] Monday and Thursday 8:15-5  [place] and Friday 8-5                                    Follow-ups after your visit        Follow-up notes from your care team     Return in about 2 weeks (around 7/20/2017).      Your next 10 appointments already scheduled     Jul 21, 2017  4:00 PM CDT   ESTABLISHED PRENATAL with DO Karel Frazier [facility] ([facility])    [address] [address]   [phone]            Aug 07, 2017  7:30 AM CDT   [facility]    [address] [address]   [phone]            Aug 14, 2017  7:30 AM CDT   [facility]    [address] [address]   [phone]            Aug 21, 2017  7:30 AM CDT   [facility]    [address] [address]   [phone]            Oct 06, 2017  1:00 PM CDT   [facility]    [address] [address]   [phone]           Bring a current list of meds and any records pertaining to this visit.  For Physicals, please bring immunization records and any forms needing to be filled out.  Please arrive 10 minutes early to complete paperwork.              Who to contact     If you have questions or need follow up information about today's clinic visit or your schedule please contact [facility] directly at [phone].  Normal or non-critical lab and imaging results will be communicated to you by MyChart, letter or phone within 4 business days after the clinic has received the results. If you do not hear from us within 7 days, please contact the clinic through Fulcrum SP Materialshart or phone. If you have a critical or abnormal lab result, we will notify you by phone as soon as possible.  Submit refill requests through [employer] or call your pharmacy and they will forward the refill request to us. Please allow 3 business days for your refill to be completed.          Additional Information About Your Visit        [employer] Information     [employer] gives you secure access to your electronic health record. If you see a primary care provider, you can also send messages to your care team and make appointments. If you have questions, please call your primary care clinic.  If you do not have a primary care provider, please call [phone] and they will assist you.        Care EveryWhere ID     This is your Care EveryWhere ID. This could be used by other organizations to access your [place] medical records  [id number]        Your Vitals Were     Pulse BMI (Body Mass Index)                68 30.61 kg/m2           Blood Pressure from Last 3 Encounters:   07/06/17 104/60   06/19/17 (!) 84/50   05/15/17 100/50    Weight from Last 3 Encounters:   07/06/17 219 lb 8 oz (99.6 kg)   06/19/17 216 lb (98 kg)   05/15/17 209 lb (94.8 kg)              Today, you had the following     No orders found for display       Primary Care Provider    None Specified       No primary provider on file.        Equal Access to Services     YOUNG CRUZ : Hadtejas Smith, waaxda luqadaha, qaybta kaalmada owen coffman . So [facility] [phone].    ATENCIÓN: Si habla español, tiene a dale disposición servicios gratuitos de asistencia lingüística. Llame al [phone].    We comply with  applicable federal civil rights laws and [place] laws. We do not discriminate on the basis of race, color, national origin, age, disability sex, sexual orientation or gender identity.            Thank you!     Thank you for choosing [facility]  for your care. Our goal is always to provide you with excellent care. Hearing back from our patients is one way we can continue to improve our services. Please take a few minutes to complete the written survey that you may receive in the mail after your visit with us. Thank you!             Your Updated Medication List - Protect others around you: Learn how to safely use, store and throw away your medicines at www.disposemymeds.org.          This list is accurate as of: 7/6/17  3:40 PM.  Always use your most recent med list.                   Brand Name Dispense Instructions for use Diagnosis    PRENATAL AD PO      Take 1 tablet by mouth        ranitidine 150 MG tablet    ZANTAC    60 tablet    Take 1 tablet (150 mg) by mouth 2 times daily    Gastroesophageal reflux disease without esophagitis

## 2017-07-06 NOTE — PATIENT INSTRUCTIONS
Thank for choosing our clinic for your health care needs. We aim to provided you with excellent care. One way that we continue to improve our care is by listening to our patients. Please take a few minutes to complete the written survey that you may receive in the mail after your visit.     You may reach your care team by calling the following numbers:    [place]- [phone]   For clinic hours at [facility]  please visit the [place] web site [email or web address]    Notification of your lab results:  Normal or non-critical lab and imaging results will be communicated to you by Mychart, letter, or phone within 7 days. If you do not hear from us within 10 days, please contact us through Mychart or phone. If you have a critical or abnormal lab result, we will notify you by phone as soon as possible.      After Hours nurse advice:  [place] Nurse Advisors:  [phone]     Medication Refills:  Please contact your pharmacy and they will forward the refill to us. Please allow 3 business days for your refills to be completed.     Secure access to your medical record:  Use [employer] (secure email communication and access to your chart) to send your primary care provider a message or make an appointment. Ask someone on your Team how to sign up for Innovacell. To log on to [employer] or for more information in Innovac[employer] please visit the website at [email or web address].      OBGYN Care Team               Janis Keil Day [facility]   Clinic hours: Tuesday 8-5 , Thursday 1145-7 and every other Friday 8-5 at [place]   Wednesday 8-5 at Andriy Shields DO  Clinic hours 7-6 Monday at [place]  8-5 Tuesdays at [place]  7-12 Fridays [facility]  1-5 Fridays at [place]    Dalia Moreno MD  Clinic hours: [place] Monday and Thursday 8:15-5  [place] and Friday 8-5

## 2017-07-06 NOTE — NURSING NOTE
"Chief Complaint   Patient presents with     Prenatal Care       Initial /60 (BP Location: Right arm, Patient Position: Chair, Cuff Size: Adult Large)  Pulse 68  Wt 219 lb 8 oz (99.6 kg)  BMI 30.61 kg/m2 Estimated body mass index is 30.61 kg/(m^2) as calculated from the following:    Height as of 3/27/14: 5' 11\" (1.803 m).    Weight as of this encounter: 219 lb 8 oz (99.6 kg).  Medication Reconciliation: complete   Loren Mann CMA      "

## 2017-07-06 NOTE — PROGRESS NOTES
Feeling well.  No complaints/   No contra/LOF/VB  Less energy this pregnancy and not sleeping well.   Will try to breast feed for a year.   Not sure about planning another pregnancy, has 10 embryos left.   Has concerns about fermin listeria..  Has no know exposure.   Reviewed cautions, transmission and rates.   RTC 2 weeks.   MAURIZIO Brar, CNM

## 2017-07-12 ENCOUNTER — MYC MEDICAL ADVICE (OUTPATIENT)
Dept: OBGYN | Facility: OTHER | Age: 37
End: 2017-07-12

## 2017-07-12 DIAGNOSIS — Z34.93 NORMAL PREGNANCY IN THIRD TRIMESTER: Primary | ICD-10-CM

## 2017-07-21 ENCOUNTER — PRENATAL OFFICE VISIT (OUTPATIENT)
Dept: OBGYN | Facility: OTHER | Age: 37
End: 2017-07-21
Payer: COMMERCIAL

## 2017-07-21 VITALS
DIASTOLIC BLOOD PRESSURE: 60 MMHG | SYSTOLIC BLOOD PRESSURE: 100 MMHG | HEART RATE: 72 BPM | BODY MASS INDEX: 30.82 KG/M2 | WEIGHT: 221 LBS

## 2017-07-21 DIAGNOSIS — K21.9 GASTROESOPHAGEAL REFLUX DISEASE WITHOUT ESOPHAGITIS: ICD-10-CM

## 2017-07-21 DIAGNOSIS — Z98.891 H/O: CESAREAN SECTION: Primary | ICD-10-CM

## 2017-07-21 DIAGNOSIS — O34.219 DECLINES VBAC (VAGINAL BIRTH AFTER CESAREAN) TRIAL: ICD-10-CM

## 2017-07-21 PROCEDURE — 99207 ZZC PRENATAL VISIT: CPT | Performed by: OBSTETRICS & GYNECOLOGY

## 2017-07-21 ASSESSMENT — PAIN SCALES - GENERAL: PAINLEVEL: NO PAIN (0)

## 2017-07-21 NOTE — MR AVS SNAPSHOT
After Visit Summary   2017    Liliana Guerra    MRN: [id number]           Patient Information     Date Of Birth          1980        Visit Information        Provider Department      2017 4:00 PM [facility]        Today's Diagnoses     H/O:  section    -  1    Declines  (vaginal birth after ) trial        Gastroesophageal reflux disease without esophagitis          Care Instructions    What to watch out for are: regular contractions every 5 min, vaginal bleeding, decreased fetal movement, or leakage of fluid.  Please call the office or go to L&D if you develop any of these signs and symptoms.      I will see you for your follow up appointment.  Please feel free to call if you have any questions or concerns.      Thanks,  Kirstie Shields, DO            Follow-ups after your visit        Follow-up notes from your care team     Return in about 2 weeks (around 2017).      Your next 10 appointments already scheduled     Aug 07, 2017  7:30 AM CDT   [facility]    [address] [address]   [phone]            Aug 14, 2017  7:30 AM CDT   ESTABLISHED PRENATAL with Kirstie Shields DO   [facility]    [address] [address]   [phone]            Aug 21, 2017  7:30 AM CDT   ESTABLISHED PRENATAL with Kirstie Shields DO   [facility]    [address] [address]   [phone]            Oct 06, 2017  1:00 PM CDT   [facility]    [address] [address]   [phone]           Bring a current list of meds and any records pertaining to this visit.  For Physicals, please bring  immunization records and any forms needing to be filled out.  Please arrive 10 minutes early to complete paperwork.              Who to contact     If you have questions or need follow up information about today's clinic visit or your schedule please contact [facility] [place] directly at [phone].  Normal or non-critical lab and imaging results will be communicated to you by MyChart, letter or phone within 4 business days after the clinic has received the results. If you do not hear from us within 7 days, please contact the clinic through SoClozhart or phone. If you have a critical or abnormal lab result, we will notify you by phone as soon as possible.  Submit refill requests through [employer] or call your pharmacy and they will forward the refill request to us. Please allow 3 business days for your refill to be completed.          Additional Information About Your Visit        MyChart Information     [employer] gives you secure access to your electronic health record. If you see a primary care provider, you can also send messages to your care team and make appointments. If you have questions, please call your primary care clinic.  If you do not have a primary care provider, please call [phone] and they will assist you.        Care EveryWhere ID     This is your Care EveryWhere ID. This could be used by other organizations to access your [place] medical records  [id number]        Your Vitals Were     Pulse BMI (Body Mass Index)                72 30.82 kg/m2           Blood Pressure from Last 3 Encounters:   07/21/17 100/60   07/06/17 104/60   06/19/17 (!) 84/50    Weight from Last 3 Encounters:   07/21/17 221 lb (100.2 kg)   07/06/17 219 lb 8 oz (99.6 kg)   06/19/17 216 lb (98 kg)              Today, you had the following     No orders found for display       Primary Care Provider    None Specified       No primary provider on file.        Equal Access to Services     YOUNG JENKINS: Hu banks  carine Smith, rupesh lopezjoannaha, qaleopoldota kamatias ricminal, owen briin hayaaprosper lariostootie randaruben latravprosper aisha. So [facility] [phone].    ATENCIÓN: Si habla español, tiene a dale disposición servicios gratuitos de asistencia lingüística. Yue al [phone].    We comply with applicable federal civil rights laws and [place] laws. We do not discriminate on the basis of race, color, national origin, age, disability sex, sexual orientation or gender identity.            Thank you!     Thank you for choosing [facility]  for your care. Our goal is always to provide you with excellent care. Hearing back from our patients is one way we can continue to improve our services. Please take a few minutes to complete the written survey that you may receive in the mail after your visit with us. Thank you!             Your Updated Medication List - Protect others around you: Learn how to safely use, store and throw away your medicines at www.disposemymeds.org.          This list is accurate as of: 7/21/17  4:18 PM.  Always use your most recent med list.                   Brand Name Dispense Instructions for use Diagnosis    order for DME     1 Device    Electric breast pump    Normal pregnancy in third trimester       PRENATAL AD PO      Take 1 tablet by mouth        ranitidine 150 MG tablet    ZANTAC    60 tablet    Take 1 tablet (150 mg) by mouth 2 times daily    Gastroesophageal reflux disease without esophagitis

## 2017-07-21 NOTE — PROGRESS NOTES
36 year old  at 34w2d weeks presents to the clinic for a routine prenatal visit.    No concerns.  Patient denies any vaginal bleeding, leakage of fluid, or contractions     Good fetal movement  Fundal height=36cm  YMUl=961  RCS scheduled for   GERD=stable  Discussed labor precautions.  RTC 2 weeks    Kirstie Shields

## 2017-07-21 NOTE — NURSING NOTE
"Chief Complaint   Patient presents with     Prenatal Care       Initial /60 (BP Location: Left arm, Patient Position: Chair, Cuff Size: Adult Regular)  Pulse 72  Wt 221 lb (100.2 kg)  BMI 30.82 kg/m2 Estimated body mass index is 30.82 kg/(m^2) as calculated from the following:    Height as of 3/27/14: 5' 11\" (1.803 m).    Weight as of this encounter: 221 lb (100.2 kg).  Medication Reconciliation: complete   Yanna Correa MA  July 21, 2017      "

## 2017-08-07 ENCOUNTER — PRENATAL OFFICE VISIT (OUTPATIENT)
Dept: OBGYN | Facility: OTHER | Age: 37
End: 2017-08-07
Payer: COMMERCIAL

## 2017-08-07 VITALS
WEIGHT: 268 LBS | BODY MASS INDEX: 37.38 KG/M2 | SYSTOLIC BLOOD PRESSURE: 100 MMHG | HEART RATE: 78 BPM | DIASTOLIC BLOOD PRESSURE: 58 MMHG

## 2017-08-07 DIAGNOSIS — O34.219 DECLINES VBAC (VAGINAL BIRTH AFTER CESAREAN) TRIAL: ICD-10-CM

## 2017-08-07 DIAGNOSIS — O09.529 SUPERVISION OF HIGH-RISK PREGNANCY OF ELDERLY MULTIGRAVIDA: Primary | ICD-10-CM

## 2017-08-07 DIAGNOSIS — Z98.891 H/O: CESAREAN SECTION: ICD-10-CM

## 2017-08-07 DIAGNOSIS — K21.9 GASTROESOPHAGEAL REFLUX DISEASE WITHOUT ESOPHAGITIS: ICD-10-CM

## 2017-08-07 PROCEDURE — 99207 ZZC PRENATAL VISIT: CPT | Performed by: OBSTETRICS & GYNECOLOGY

## 2017-08-07 PROCEDURE — 87653 STREP B DNA AMP PROBE: CPT | Performed by: OBSTETRICS & GYNECOLOGY

## 2017-08-07 PROCEDURE — 87186 SC STD MICRODIL/AGAR DIL: CPT | Performed by: OBSTETRICS & GYNECOLOGY

## 2017-08-07 ASSESSMENT — PAIN SCALES - GENERAL: PAINLEVEL: NO PAIN (0)

## 2017-08-07 NOTE — PROGRESS NOTES
36 year old  at 36w5d weeks presents to the clinic for a routine prenatal visit.  No concerns.  No vaginal bleeding, leakage of fluid, or contractions  Fundal height=39cm  GTOx=342's  CX=Cl//-3  GBS done today  RCS=  GERD=stable  Labor precautions discussed  RTC 1 week    Kirstie Shields

## 2017-08-07 NOTE — NURSING NOTE
"Chief Complaint   Patient presents with     Prenatal Care     Needs GBS       Initial /58  Pulse 78  Wt 268 lb (121.6 kg)  BMI 37.38 kg/m2 Estimated body mass index is 37.38 kg/(m^2) as calculated from the following:    Height as of 3/27/14: 5' 11\" (1.803 m).    Weight as of this encounter: 268 lb (121.6 kg).  Medication Reconciliation: complete     36w5d  GBS today  "

## 2017-08-07 NOTE — MR AVS SNAPSHOT
After Visit Summary   2017    Liliana Guerra    MRN: [id number]           Patient Information     Date Of Birth          1980        Visit Information        Provider Department      2017 7:30 AM Kirstie Shields DO [facility]        Today's Diagnoses     Supervision of high-risk pregnancy of elderly multigravida    -  1    H/O:  section        Declines  (vaginal birth after ) trial        Gastroesophageal reflux disease without esophagitis          Care Instructions    What to watch out for are: regular contractions every 5 min, vaginal bleeding, decreased fetal movement, or leakage of fluid.  Please call the office or go to L&D if you develop any of these signs and symptoms.      I will see you for your follow up appointment.  Please feel free to call if you have any questions or concerns.      Thanks,  Kirstie Shields, DO            Follow-ups after your visit        Follow-up notes from your care team     Return in about 1 week (around 2017).      Your next 10 appointments already scheduled     Aug 14, 2017  7:30 AM CDT   [facility]    [address] [place] [address]   [phone]            Aug 21, 2017  7:30 AM CDT   ESTABLISHED PRENATAL with Kirstie Shields DO   [facility]    [address] [place] [address]   [phone]            Oct 06, 2017  1:00 PM CDT   Office Visit with Kirstie Shields DO   [facility]    [address] [place] [address]   [phone]           Bring a current list of meds and any records pertaining to this visit. For Physicals, please bring immunization records and any forms needing to be filled out. Please arrive 10 minutes early to complete paperwork.              Who to contact     If you  have questions or need follow up information about today's clinic visit or your schedule please contact [facility] [place] directly at [phone].  Normal or non-critical lab and imaging results will be communicated to you by MyChart, letter or phone within 4 business days after the clinic has received the results. If you do not hear from us within 7 days, please contact the clinic through TouristRhart or phone. If you have a critical or abnormal lab result, we will notify you by phone as soon as possible.  Submit refill requests through [employer] or call your pharmacy and they will forward the refill request to us. Please allow 3 business days for your refill to be completed.          Additional Information About Your Visit        TouristRharXipLink Information     [employer] gives you secure access to your electronic health record. If you see a primary care provider, you can also send messages to your care team and make appointments. If you have questions, please call your primary care clinic.  If you do not have a primary care provider, please call [phone] and they will assist you.        Care EveryWhere ID     This is your Care EveryWhere ID. This could be used by other organizations to access your [place] medical records  [id number]        Your Vitals Were     Pulse BMI (Body Mass Index)                78 37.38 kg/m2           Blood Pressure from Last 3 Encounters:   08/07/17 100/58   07/21/17 100/60   07/06/17 104/60    Weight from Last 3 Encounters:   08/07/17 268 lb (121.6 kg)   07/21/17 221 lb (100.2 kg)   07/06/17 219 lb 8 oz (99.6 kg)              We Performed the Following     Strep, Group B by PCR        Primary Care Provider    None Specified       No primary provider on file.        Equal Access to Services     [facility]: Hu Smith, rupesh paulson, owen wise. So [facility] [phone].    ATENCIÓN: german Ramesh dale  disposición servicios gratuitos de asistencia lingüística. Yue ashton [phone].    We comply with applicable federal civil rights laws and [place] laws. We do not discriminate on the basis of race, color, national origin, age, disability sex, sexual orientation or gender identity.            Thank you!     Thank you for choosing [facility]  for your care. Our goal is always to provide you with excellent care. Hearing back from our patients is one way we can continue to improve our services. Please take a few minutes to complete the written survey that you may receive in the mail after your visit with us. Thank you!             Your Updated Medication List - Protect others around you: Learn how to safely use, store and throw away your medicines at www.disposemymeds.org.          This list is accurate as of: 8/7/17  7:46 AM.  Always use your most recent med list.                   Brand Name Dispense Instructions for use Diagnosis    order for DME     1 Device    Electric breast pump    Normal pregnancy in third trimester       PRENATAL AD PO      Take 1 tablet by mouth        ranitidine 150 MG tablet    ZANTAC    60 tablet    Take 1 tablet (150 mg) by mouth 2 times daily    Gastroesophageal reflux disease without esophagitis

## 2017-08-08 LAB
GP B STREP DNA SPEC QL NAA+PROBE: ABNORMAL
SPECIMEN SOURCE: ABNORMAL

## 2017-08-11 ENCOUNTER — TELEPHONE (OUTPATIENT)
Dept: OBGYN | Facility: CLINIC | Age: 37
End: 2017-08-11

## 2017-08-11 LAB
BACTERIA SPEC CULT: ABNORMAL
MICRO REPORT STATUS: ABNORMAL
MICROORGANISM SPEC CULT: ABNORMAL
SPECIMEN SOURCE: ABNORMAL

## 2017-08-11 NOTE — TELEPHONE ENCOUNTER
Pt's time for her c-sec was rescheduled  on the 24th time need's to be put back to 7:30 AM per provider's schedule. Please contact PT.  Yolie Mckeon CMA

## 2017-08-11 NOTE — TELEPHONE ENCOUNTER
Patient returned call and was informed of her surgery being moved back to 7:30 am.  Dalia Lees CMA  August 11, 2017 2:35 PM

## 2017-08-14 ENCOUNTER — PRENATAL OFFICE VISIT (OUTPATIENT)
Dept: OBGYN | Facility: OTHER | Age: 37
End: 2017-08-14
Payer: COMMERCIAL

## 2017-08-14 VITALS
WEIGHT: 226 LBS | HEART RATE: 70 BPM | DIASTOLIC BLOOD PRESSURE: 64 MMHG | SYSTOLIC BLOOD PRESSURE: 100 MMHG | BODY MASS INDEX: 31.52 KG/M2

## 2017-08-14 DIAGNOSIS — K21.9 GASTROESOPHAGEAL REFLUX DISEASE WITHOUT ESOPHAGITIS: ICD-10-CM

## 2017-08-14 DIAGNOSIS — Z98.891 H/O: CESAREAN SECTION: Primary | ICD-10-CM

## 2017-08-14 DIAGNOSIS — O34.219 DECLINES VBAC (VAGINAL BIRTH AFTER CESAREAN) TRIAL: ICD-10-CM

## 2017-08-14 PROCEDURE — 99207 ZZC PRENATAL VISIT: CPT | Performed by: OBSTETRICS & GYNECOLOGY

## 2017-08-14 ASSESSMENT — PAIN SCALES - GENERAL: PAINLEVEL: NO PAIN (0)

## 2017-08-14 NOTE — PROGRESS NOTES
37 year old  at 37w5d weeks presents to the clinic for a routine prenatal visit.  No concerns.  Complains of feeling more pressure.  No vaginal bleeding, leakage of fluid, or contractions   Fundal height=40cm  TVDl=290  CX=deferred per patient request  Discussed labor precautions  GERD=stable  RTC 1 week  RCS=  GBS=Positive    Kirstie Shields

## 2017-08-14 NOTE — NURSING NOTE
"Chief Complaint   Patient presents with     Prenatal Care       Initial /64  Pulse 70  Wt 226 lb (102.5 kg)  BMI 31.52 kg/m2 Estimated body mass index is 31.52 kg/(m^2) as calculated from the following:    Height as of 3/27/14: 5' 11\" (1.803 m).    Weight as of this encounter: 226 lb (102.5 kg).  Medication Reconciliation: complete     37w5d    "

## 2017-08-14 NOTE — MR AVS SNAPSHOT
After Visit Summary   2017    Liliana Guerra    MRN: [id number]           Patient Information     Date Of Birth          1980        Visit Information        Provider Department      2017 7:30 AM Kirstie Shields DO [facility]        Today's Diagnoses     H/O:  section    -  1    Declines  (vaginal birth after ) trial        Gastroesophageal reflux disease without esophagitis          Care Instructions    What to watch out for are: regular contractions every 5 min, vaginal bleeding, decreased fetal movement, or leakage of fluid.  Please call the office or go to L&D if you develop any of these signs and symptoms.      I will see you for your follow up appointment.  Please feel free to call if you have any questions or concerns.      Thanks,  Kirstie Shields, DO            Follow-ups after your visit        Follow-up notes from your care team     Return in about 1 week (around 2017).      Your next 10 appointments already scheduled     Aug 21, 2017  7:30 AM CDT   [facility]ABL[facility]   [facility]    [address] [place] [id number] [phone]            Oct 06, 2017  1:00 PM CDT   Office Visit with Kirstie Shields DO   [facility] ([address] [id number] [phone]           Bring a current list of meds and any records pertaining to this visit. For Physicals, please bring immunization records and any forms needing to be filled out. Please arrive 10 minutes early to complete paperwork.              Who to contact     If you have questions or need follow up information about today's clinic visit or your schedule please contact [facility] directly at [phone].  Normal or non-critical lab and imaging results will be communicated to you by MyChart, letter or phone within 4  business days after the clinic has received the results. If you do not hear from us within 7 days, please contact the clinic through [employer] or phone. If you have a critical or abnormal lab result, we will notify you by phone as soon as possible.  Submit refill requests through [employer] or call your pharmacy and they will forward the refill request to us. Please allow 3 business days for your refill to be completed.          Additional Information About Your Visit        [employer] Information     [employer] gives you secure access to your electronic health record. If you see a primary care provider, you can also send messages to your care team and make appointments. If you have questions, please call your primary care clinic.  If you do not have a primary care provider, please call [phone] and they will assist you.        Care EveryWhere ID     This is your Care EveryWhere ID. This could be used by other organizations to access your [facility] medical records  [id number]        Your Vitals Were     Pulse BMI (Body Mass Index)                70 31.52 kg/m2           Blood Pressure from Last 3 Encounters:   08/14/17 100/64   08/07/17 100/58   07/21/17 100/60    Weight from Last 3 Encounters:   08/14/17 226 lb (102.5 kg)   08/07/17 268 lb (121.6 kg)   07/21/17 221 lb (100.2 kg)              Today, you had the following     No orders found for display       Primary Care Provider    None       No address on file        Equal Access to Services     YOUNG CRUZ : Hadii sonido amryo Somary ellen, waaxda luqadaha, qaybta kaalmada owen coffman. So [facility] [phone].    ATENCIÓN: Si habla español, tiene a dale disposición servicios gratuitos de asistencia lingüística. Llame al [phone].    We comply with applicable federal civil rights laws and [place] laws. We do not discriminate on the basis of race, color, national origin, age, disability sex, sexual orientation or gender  identity.            Thank you!     Thank you for choosing [facility]  for your care. Our goal is always to provide you with excellent care. Hearing back from our patients is one way we can continue to improve our services. Please take a few minutes to complete the written survey that you may receive in the mail after your visit with us. Thank you!             Your Updated Medication List - Protect others around you: Learn how to safely use, store and throw away your medicines at www.disposemymeds.org.          This list is accurate as of: 8/14/17  7:52 AM.  Always use your most recent med list.                   Brand Name Dispense Instructions for use Diagnosis    order for DME     1 Device    Electric breast pump    Normal pregnancy in third trimester       PRENATAL AD PO      Take 1 tablet by mouth        ranitidine 150 MG tablet    ZANTAC    60 tablet    Take 1 tablet (150 mg) by mouth 2 times daily    Gastroesophageal reflux disease without esophagitis

## 2017-08-21 ENCOUNTER — PRENATAL OFFICE VISIT (OUTPATIENT)
Dept: OBGYN | Facility: OTHER | Age: 37
End: 2017-08-21
Payer: COMMERCIAL

## 2017-08-21 VITALS
WEIGHT: 231 LBS | BODY MASS INDEX: 32.22 KG/M2 | DIASTOLIC BLOOD PRESSURE: 60 MMHG | HEART RATE: 80 BPM | SYSTOLIC BLOOD PRESSURE: 100 MMHG

## 2017-08-21 DIAGNOSIS — O09.523 AMA (ADVANCED MATERNAL AGE) MULTIGRAVIDA 35+, THIRD TRIMESTER: Primary | ICD-10-CM

## 2017-08-21 DIAGNOSIS — K21.9 GASTROESOPHAGEAL REFLUX DISEASE WITHOUT ESOPHAGITIS: ICD-10-CM

## 2017-08-21 DIAGNOSIS — Z98.891 H/O: CESAREAN SECTION: ICD-10-CM

## 2017-08-21 DIAGNOSIS — O34.219 DECLINES VBAC (VAGINAL BIRTH AFTER CESAREAN) TRIAL: ICD-10-CM

## 2017-08-21 DIAGNOSIS — O09.522 ELDERLY MULTIGRAVIDA IN SECOND TRIMESTER: ICD-10-CM

## 2017-08-21 DIAGNOSIS — Z23 FLU VACCINE NEED: ICD-10-CM

## 2017-08-21 DIAGNOSIS — Z34.91 NORMAL PREGNANCY IN FIRST TRIMESTER: ICD-10-CM

## 2017-08-21 PROCEDURE — 99207 ZZC PRENATAL VISIT: CPT | Performed by: OBSTETRICS & GYNECOLOGY

## 2017-08-21 ASSESSMENT — PAIN SCALES - GENERAL: PAINLEVEL: NO PAIN (0)

## 2017-08-21 NOTE — NURSING NOTE
"Chief Complaint   Patient presents with     Prenatal Care       Initial /60  Pulse 80  Wt 231 lb (104.8 kg)  BMI 32.22 kg/m2 Estimated body mass index is 32.22 kg/(m^2) as calculated from the following:    Height as of 3/27/14: 5' 11\" (1.803 m).    Weight as of this encounter: 231 lb (104.8 kg).  Medication Reconciliation: complete     38w5d    "

## 2017-08-21 NOTE — MR AVS SNAPSHOT
After Visit Summary   2017    Liliana Guerra    MRN: [id number]           Patient Information     Date Of Birth          1980        Visit Information        Provider Department      2017 7:30 AM Kirstie Shields DO [facility]        Today's Diagnoses     AMA (advanced maternal age) multigravida 35+, third trimester    -  1    Gastroesophageal reflux disease without esophagitis        Elderly multigravida in second trimester        Normal pregnancy in first trimester        Flu vaccine need        H/O:  section        Declines  (vaginal birth after ) trial          Care Instructions    What to watch out for are: regular contractions every 5 min, vaginal bleeding, decreased fetal movement, or leakage of fluid.  Please call the office or go to L&D if you develop any of these signs and symptoms.      I will see you for your follow up appointment.  Please feel free to call if you have any questions or concerns.      Thanks,  Kirstie Shields, DO            Follow-ups after your visit        Follow-up notes from your care team     Return in about 3 days (around 2017).      Your next 10 appointments already scheduled     Oct 06, 2017  1:00 PM CDT   Office Visit with Kirstie Shields DO   [facility]    [address] [facility] [address] [phone]           Bring a current list of meds and any records pertaining to this visit. For Physicals, please bring immunization records and any forms needing to be filled out. Please arrive 10 minutes early to complete paperwork.              Who to contact     If you have questions or need follow up information about today's clinic visit or your schedule please contact [facility] directly at [phone].  Normal or non-critical lab and imaging results will be communicated to you by MyChart, letter or phone within 4 business days after  the clinic has received the results. If you do not hear from us within 7 days, please contact the clinic through [employer] or phone. If you have a critical or abnormal lab result, we will notify you by phone as soon as possible.  Submit refill requests through [employer] or call your pharmacy and they will forward the refill request to us. Please allow 3 business days for your refill to be completed.          Additional Information About Your Visit        [employer] Information     [employer] gives you secure access to your electronic health record. If you see a primary care provider, you can also send messages to your care team and make appointments. If you have questions, please call your primary care clinic.  If you do not have a primary care provider, please call [phone] and they will assist you.        Care EveryWhere ID     This is your Care EveryWhere ID. This could be used by other organizations to access your [facility] medical records  [id number]        Your Vitals Were     Pulse BMI (Body Mass Index)                80 32.22 kg/m2           Blood Pressure from Last 3 Encounters:   08/21/17 100/60   08/14/17 100/64   08/07/17 100/58    Weight from Last 3 Encounters:   08/21/17 231 lb (104.8 kg)   08/14/17 226 lb (102.5 kg)   08/07/17 268 lb (121.6 kg)              Today, you had the following     No orders found for display       Primary Care Provider    None       No address on file        Equal Access to Services     CLARIBEL CRUZ : Hadii aad ku hadasho Somary ellen, waaxda luqadaha, qaybta kaalmada ademinal, owen lopez . So [facility] [phone].    ATENCIÓN: Si habla español, tiene a dale disposición servicios gratuitos de asistencia lingüística. Llame al [phone].    We comply with applicable federal civil rights laws and [place] laws. We do not discriminate on the basis of race, color, national origin, age, disability sex, sexual orientation or gender identity.            Thank  you!     Thank you for choosing [facility]  for your care. Our goal is always to provide you with excellent care. Hearing back from our patients is one way we can continue to improve our services. Please take a few minutes to complete the written survey that you may receive in the mail after your visit with us. Thank you!             Your Updated Medication List - Protect others around you: Learn how to safely use, store and throw away your medicines at www.disposemymeds.org.          This list is accurate as of: 8/21/17  7:50 AM.  Always use your most recent med list.                   Brand Name Dispense Instructions for use Diagnosis    order for DME     1 Device    Electric breast pump    Normal pregnancy in third trimester       PRENATAL AD PO      Take 1 tablet by mouth        ranitidine 150 MG tablet    ZANTAC    60 tablet    Take 1 tablet (150 mg) by mouth 2 times daily    Gastroesophageal reflux disease without esophagitis
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
## 2017-08-30 ENCOUNTER — TELEPHONE (OUTPATIENT)
Dept: OBGYN | Facility: OTHER | Age: 37
End: 2017-08-30

## 2017-08-30 NOTE — TELEPHONE ENCOUNTER
Reason for Call:  Form, our goal is to have forms completed with 72 hours, however, some forms may require a visit or additional information.    Type of letter, form or note:  fitness for duty form for alexis ni    Who is the form from?: Patient    Where did the form come from: Patient or family brought in       What clinic location was the form placed at?: [facility] - [phone]    Where the form was placed: 's Box    What number is listed as a contact on the form?: fax number [phone]       Additional comments: please complete and fax. Patient dropped off 2 forms but only this one fax and she  the other one.  Thank you    Call taken on 8/30/2017 at 10:56 AM by Joanna Orourke

## 2017-08-30 NOTE — TELEPHONE ENCOUNTER
Reason for Call:  Form, our goal is to have forms completed with 72 hours, however, some forms may require a visit or additional information.    Type of letter, form or note:  Disability    Who is the form from?: Patient    Where did the form come from: Patient or family brought in       What clinic location was the form placed at?: [facility] - [phone]    Where the form was placed: 's Box    What number is listed as a contact on the form?: no number listed.       Additional comments: patient would like this form mailed to her home [id number] [address] [place] [address].  [place] [id number]    Call taken on 8/30/2017 at 11:01 AM by Joanna Orourke

## 2017-09-05 NOTE — TELEPHONE ENCOUNTER
Forms completed, scanned and mailed to patients home with with other form as she called in and requested they both be mailed to her instead of faxing this one.

## 2017-09-11 ENCOUNTER — OFFICE VISIT (OUTPATIENT)
Dept: OBGYN | Facility: OTHER | Age: 37
End: 2017-09-11
Payer: COMMERCIAL

## 2017-09-11 VITALS
WEIGHT: 210 LBS | BODY MASS INDEX: 29.29 KG/M2 | DIASTOLIC BLOOD PRESSURE: 80 MMHG | HEART RATE: 80 BPM | SYSTOLIC BLOOD PRESSURE: 98 MMHG

## 2017-09-11 DIAGNOSIS — Z98.891 S/P C-SECTION: Primary | ICD-10-CM

## 2017-09-11 PROBLEM — Z34.91 NORMAL PREGNANCY IN FIRST TRIMESTER: Status: RESOLVED | Noted: 2017-01-30 | Resolved: 2017-09-11

## 2017-09-11 PROBLEM — K21.9 GASTROESOPHAGEAL REFLUX DISEASE WITHOUT ESOPHAGITIS: Status: RESOLVED | Noted: 2017-06-19 | Resolved: 2017-09-11

## 2017-09-11 PROBLEM — O34.219 DECLINES VBAC (VAGINAL BIRTH AFTER CESAREAN) TRIAL: Status: RESOLVED | Noted: 2017-01-30 | Resolved: 2017-09-11

## 2017-09-11 PROBLEM — Z23 FLU VACCINE NEED: Status: RESOLVED | Noted: 2017-01-30 | Resolved: 2017-09-11

## 2017-09-11 PROBLEM — O09.522 ELDERLY MULTIGRAVIDA IN SECOND TRIMESTER: Status: RESOLVED | Noted: 2017-04-05 | Resolved: 2017-09-11

## 2017-09-11 PROCEDURE — 99024 POSTOP FOLLOW-UP VISIT: CPT | Performed by: OBSTETRICS & GYNECOLOGY

## 2017-09-11 ASSESSMENT — PAIN SCALES - GENERAL: PAINLEVEL: NO PAIN (0)

## 2017-09-11 NOTE — MR AVS SNAPSHOT
After Visit Summary   2017    Liliana Guerra    MRN: [id number]           Patient Information     Date Of Birth          1980        Visit Information        Provider Department      2017 9:15 AM Kirstie Shields DO [facility]        Today's Diagnoses     S/P     -  1      Care Instructions    Please call if you any questions.    [facility]  [address] [place]   [address]  [phone]        Kirstie Shields            Follow-ups after your visit        Follow-up notes from your care team     Return in about 4 weeks (around 10/9/2017).      Your next 10 appointments already scheduled     Oct 06, 2017  1:00 PM CDT   Office Visit with Kirstie Shields DO   [facility]    [address] [facility] [id number] [phone]           Bring a current list of meds and any records pertaining to this visit. For Physicals, please bring immunization records and any forms needing to be filled out. Please arrive 10 minutes early to complete paperwork.              Who to contact     If you have questions or need follow up information about today's clinic visit or your schedule please contact [facility] directly at [phone].  Normal or non-critical lab and imaging results will be communicated to you by MyChart, letter or phone within 4 business days after the clinic has received the results. If you do not hear from us within 7 days, please contact the clinic through [employer] or phone. If you have a critical or abnormal lab result, we will notify you by phone as soon as possible.  Submit refill requests through [employer] or call your pharmacy and they will forward the refill request to us. Please allow 3 business days for your refill to be completed.          Additional Information About Your Visit        [employer] Information     [employer] gives you secure access to your electronic  health record. If you see a primary care provider, you can also send messages to your care team and make appointments. If you have questions, please call your primary care clinic.  If you do not have a primary care provider, please call [phone] and they will assist you.        Care EveryWhere ID     This is your Care EveryWhere ID. This could be used by other organizations to access your [facility] medical records  [id number]        Your Vitals Were     Pulse BMI (Body Mass Index)                80 29.29 kg/m2           Blood Pressure from Last 3 Encounters:   09/11/17 98/80   08/21/17 100/60   08/14/17 100/64    Weight from Last 3 Encounters:   09/11/17 210 lb (95.3 kg)   08/21/17 231 lb (104.8 kg)   08/14/17 226 lb (102.5 kg)              Today, you had the following     No orders found for display       Primary Care Provider    None       No address on file        Equal Access to Services     YOUNG CRUZ : Hadii sonido maryo Sarah, waaxda luqadaha, qaybta kaalmada adetootieyada, owen lopez . So [facility] [phone].    ATENCIÓN: Si habla español, tiene a dale disposición servicios gratuitos de asistencia lingüística. Lawsoname al [phone].    We comply with applicable federal civil rights laws and [place] laws. We do not discriminate on the basis of race, color, national origin, age, disability sex, sexual orientation or gender identity.            Thank you!     Thank you for choosing [facility]  for your care. Our goal is always to provide you with excellent care. Hearing back from our patients is one way we can continue to improve our services. Please take a few minutes to complete the written survey that you may receive in the mail after your visit with us. Thank you!             Your Updated Medication List - Protect others around you: Learn how to safely use, store and throw away your medicines at www.disposemymeds.org.          This list is accurate as of:  9/11/17  9:47 AM.  Always use your most recent med list.                   Brand Name Dispense Instructions for use Diagnosis    order for DME     1 Device    Electric breast pump    Normal pregnancy in third trimester       PRENATAL AD PO      Take 1 tablet by mouth

## 2017-09-11 NOTE — PATIENT INSTRUCTIONS
Please call if you any questions.    [address]   [address]  [phone]        Kirstie Shields

## 2017-09-11 NOTE — NURSING NOTE
"Chief Complaint   Patient presents with     Post-partum Follow Up Call     2 week  check       Initial BP 98/80  Pulse 80  Wt 210 lb (95.3 kg)  BMI 29.29 kg/m2 Estimated body mass index is 29.29 kg/(m^2) as calculated from the following:    Height as of 3/27/14: 5' 11\" (1.803 m).    Weight as of this encounter: 210 lb (95.3 kg).  Medication Reconciliation: complete  "
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
## 2017-10-09 ENCOUNTER — OFFICE VISIT (OUTPATIENT)
Dept: OBGYN | Facility: OTHER | Age: 37
End: 2017-10-09
Payer: COMMERCIAL

## 2017-10-09 VITALS
BODY MASS INDEX: 28.52 KG/M2 | WEIGHT: 204.5 LBS | DIASTOLIC BLOOD PRESSURE: 60 MMHG | HEART RATE: 72 BPM | SYSTOLIC BLOOD PRESSURE: 100 MMHG

## 2017-10-09 PROCEDURE — 99207 ZZC POST PARTUM EXAM: CPT | Performed by: OBSTETRICS & GYNECOLOGY

## 2017-10-09 ASSESSMENT — PATIENT HEALTH QUESTIONNAIRE - PHQ9: SUM OF ALL RESPONSES TO PHQ QUESTIONS 1-9: 0

## 2017-10-09 NOTE — MR AVS SNAPSHOT
After Visit Summary   10/9/2017    Liliana Guerra    MRN: [id number]           Patient Information     Date Of Birth          1980        Visit Information        Provider Department      10/9/2017 10:30 AM Dalia Moreno MD [facility]        Today's Diagnoses     Postpartum care and examination    -  1       Follow-ups after your visit        Follow-up notes from your care team     Return if symptoms worsen or fail to improve.      Who to contact     If you have questions or need follow up information about today's clinic visit or your schedule please contact [facility] directly at [phone].  Normal or non-critical lab and imaging results will be communicated to you by MyChart, letter or phone within 4 business days after the clinic has received the results. If you do not hear from us within 7 days, please contact the clinic through Slatedhart or phone. If you have a critical or abnormal lab result, we will notify you by phone as soon as possible.  Submit refill requests through Diasome or call your pharmacy and they will forward the refill request to us. Please allow 3 business days for your refill to be completed.          Additional Information About Your Visit        MyChart Information     Diasome gives you secure access to your electronic health record. If you see a primary care provider, you can also send messages to your care team and make appointments. If you have questions, please call your primary care clinic.  If you do not have a primary care provider, please call [phone] and they will assist you.        Care EveryWhere ID     This is your Care EveryWhere ID. This could be used by other organizations to access your [facility] medical records  [id number]        Your Vitals Were     Pulse BMI (Body Mass Index)                72 28.52 kg/m2           Blood Pressure from Last 3 Encounters:   10/09/17 100/60   09/11/17 98/80   08/21/17 100/60     Weight from Last 3 Encounters:   10/09/17 204 lb 8 oz (92.8 kg)   09/11/17 210 lb (95.3 kg)   08/21/17 231 lb (104.8 kg)              Today, you had the following     No orders found for display       Primary Care Provider Fax #    Provider Not In System [phone]                Equal Access to Services     CLARIBEL PARRISHEVARISTO : Hadii aad ku hadasho Soomaali, waaxda luqadaha, qaybta kaalmada adeegyada, owen tolentinoebonynaya lopez . So [facility] [phone].    ATENCIÓN: Si habla español, tiene a dale disposición servicios gratuitos de asistencia lingüística. Llame al [phone].    We comply with applicable federal civil rights laws and [place] laws. We do not discriminate on the basis of race, color, national origin, age, disability, sex, sexual orientation, or gender identity.            Thank you!     Thank you for choosing [facility]  for your care. Our goal is always to provide you with excellent care. Hearing back from our patients is one way we can continue to improve our services. Please take a few minutes to complete the written survey that you may receive in the mail after your visit with us. Thank you!             Your Updated Medication List - Protect others around you: Learn how to safely use, store and throw away your medicines at www.disposemymeds.org.          This list is accurate as of: 10/9/17 10:37 AM.  Always use your most recent med list.                   Brand Name Dispense Instructions for use Diagnosis    order for DME     1 Device    Electric breast pump    Normal pregnancy in third trimester       PRENATAL AD PO      Take 1 tablet by mouth

## 2017-10-09 NOTE — PROGRESS NOTES
SUBJECTIVE:  37 year old  here for a 6-week postpartum checkup.         Obstetric History       T2      L2     SAB0   TAB0   Ectopic0   Multiple0   Live Births2       # Outcome Date GA Lbr Carlos/2nd Weight Sex Delivery Anes PTL Lv   3 Term 17 39w1d  8 lb 9 oz (3.884 kg) F CS-LTranv Spinal N ROSY      Name: Patty      Apgar1:  8                Apgar5: 9   2 SAB 16           1 Term 01/24/15 41w1d  9 lb 8 oz (4.309 kg) M CS-LTranv Spinal N ROSY      Apgar1:  8                Apgar5: 9          Complications: none     Since delivery, she has been pumping breast milk.  She has no bleeding, abnormal discharge or pain. She has not had intercourse since delivery. Patient screened for postpartum depression. Has good support system in place.    Lab Results   Component Value Date    PAP NIL 2017    PAP NIL 2013         EXAM:  /60 (BP Location: Left arm, Patient Position: Chair, Cuff Size: Adult Regular)  Pulse 72  Wt 204 lb 8 oz (92.8 kg)  BMI 28.52 kg/m2     General: alert, healthy appearing woman  HEENT: normal  Pelvic exam:  Patient declined exam today    Pap:  ______ with HPV cotesting;    _______with Reflex HPV;  ____x__ not indicated    ASSESSMENT:  Normal postpartum exam    PLAN:  1. Contraceptive options reviewed; patient wishes to utilize: nothing  2. Continue with annual health maintenance exams.   3. Patient will follow up for annual exams    Dalia Moreno MD

## 2017-10-09 NOTE — NURSING NOTE
"Chief Complaint   Patient presents with     Post Partum Exam       Initial /60 (BP Location: Left arm, Patient Position: Chair, Cuff Size: Adult Regular)  Pulse 72  Wt 204 lb 8 oz (92.8 kg)  BMI 28.52 kg/m2 Estimated body mass index is 28.52 kg/(m^2) as calculated from the following:    Height as of 3/27/14: 5' 11\" (1.803 m).    Weight as of this encounter: 204 lb 8 oz (92.8 kg).  BP completed using cuff size: regular        The following HM Due: NONE      The following patient reported/Care Every where data was sent to:  P ABSTRACT QUALITY INITIATIVES [50947]       N/a    Caro Kinsey CMA  2017           "

## 2017-10-25 ENCOUNTER — ALLIED HEALTH/NURSE VISIT (OUTPATIENT)
Dept: FAMILY MEDICINE | Facility: OTHER | Age: 37
End: 2017-10-25
Payer: COMMERCIAL

## 2017-10-25 DIAGNOSIS — Z23 NEED FOR PROPHYLACTIC VACCINATION AND INOCULATION AGAINST INFLUENZA: Primary | ICD-10-CM

## 2017-10-25 PROCEDURE — 90471 IMMUNIZATION ADMIN: CPT

## 2017-10-25 PROCEDURE — 90686 IIV4 VACC NO PRSV 0.5 ML IM: CPT

## 2017-10-25 PROCEDURE — 99207 ZZC NO CHARGE NURSE ONLY: CPT

## 2017-10-25 NOTE — NURSING NOTE
Injectable Influenza Immunization Documentation      1.  Is the person to be vaccinated sick today?  No    2. Does the person to be vaccinated have an allergy to eggs or to a component of the vaccine?   No      3. Has the person to be vaccinated today ever had a serious reaction to influenza vaccine in the past?  No      4. Has the person to be vaccinated ever had Guillain-Oxford syndrome?  No    Prior to injection verified patient identity using patient's name and date of birth.    Patient instructed to wait 20 minutes and report any reactions such as shortness of breath, swelling, itching to medical staff.     Form completed by Peter Ford MA

## 2017-10-25 NOTE — MR AVS SNAPSHOT
After Visit Summary   10/25/2017    Liliana Guerra    MRN: [id number]           Patient Information     Date Of Birth          1980        Visit Information        Provider Department      10/25/2017 11:00 AM NL FLU SHOT [facility]        Today's Diagnoses     Need for prophylactic vaccination and inoculation against influenza    -  1       Follow-ups after your visit        Who to contact     If you have questions or need follow up information about today's clinic visit or your schedule please contact [facility] directly at [phone].  Normal or non-critical lab and imaging results will be communicated to you by Buyospherehart, letter or phone within 4 business days after the clinic has received the results. If you do not hear from us within 7 days, please contact the clinic through [email or web address] or phone. If you have a critical or abnormal lab result, we will notify you by phone as soon as possible.  Submit refill requests through Zura! or call your pharmacy and they will forward the refill request to us. Please allow 3 business days for your refill to be completed.          Additional Information About Your Visit        MyChart Information     Zura! gives you secure access to your electronic health record. If you see a primary care provider, you can also send messages to your care team and make appointments. If you have questions, please call your primary care clinic.  If you do not have a primary care provider, please call [phone] and they will assist you.        Care EveryWhere ID     This is your Care EveryWhere ID. This could be used by other organizations to access your [facility] medical records  [id number]         Blood Pressure from Last 3 Encounters:   10/09/17 100/60   09/11/17 98/80   08/21/17 100/60    Weight from Last 3 Encounters:   10/09/17 204 lb 8 oz (92.8 kg)   09/11/17 210 lb (95.3 kg)   08/21/17 231 lb (104.8 kg)              We Performed  the Following     FLU VAC, SPLIT VIRUS IM > 3 YO (QUADRIVALENT) [53384]     Vaccine Administration, Initial [33549]        Primary Care Provider Fax #    Provider Not In System [phone]                Equal Access to Services     YOUNG CRUZ : Hadii aad ku hadbrian Smith, waobduliada luqadaha, qaleopoldota kaalmada augustus, owen handprosper lariostootie castro laAngelitadahlia leonard. So [facility] [phone].    ATENCIÓN: Si habla español, tiene a dale disposición servicios gratuitos de asistencia lingüística. Llame al [phone].    We comply with applicable federal civil rights laws and [place] laws. We do not discriminate on the basis of race, color, national origin, age, disability, sex, sexual orientation, or gender identity.            Thank you!     Thank you for choosing [facility]  for your care. Our goal is always to provide you with excellent care. Hearing back from our patients is one way we can continue to improve our services. Please take a few minutes to complete the written survey that you may receive in the mail after your visit with us. Thank you!             Your Updated Medication List - Protect others around you: Learn how to safely use, store and throw away your medicines at www.disposemymeds.org.          This list is accurate as of: 10/25/17 11:37 AM.  Always use your most recent med list.                   Brand Name Dispense Instructions for use Diagnosis    order for DME     1 Device    Electric breast pump    Normal pregnancy in third trimester       PRENATAL AD PO      Take 1 tablet by mouth

## 2018-11-16 ENCOUNTER — OFFICE VISIT (OUTPATIENT)
Dept: FAMILY MEDICINE | Facility: OTHER | Age: 38
End: 2018-11-16
Payer: COMMERCIAL

## 2018-11-16 VITALS
SYSTOLIC BLOOD PRESSURE: 108 MMHG | BODY MASS INDEX: 26.64 KG/M2 | TEMPERATURE: 97.7 F | OXYGEN SATURATION: 97 % | DIASTOLIC BLOOD PRESSURE: 58 MMHG | WEIGHT: 191 LBS | HEART RATE: 86 BPM

## 2018-11-16 DIAGNOSIS — R07.0 THROAT PAIN: ICD-10-CM

## 2018-11-16 DIAGNOSIS — J02.0 ACUTE STREPTOCOCCAL PHARYNGITIS: Primary | ICD-10-CM

## 2018-11-16 LAB
DEPRECATED S PYO AG THROAT QL EIA: ABNORMAL
SPECIMEN SOURCE: ABNORMAL

## 2018-11-16 PROCEDURE — 99213 OFFICE O/P EST LOW 20 MIN: CPT | Performed by: PHYSICIAN ASSISTANT

## 2018-11-16 PROCEDURE — 87880 STREP A ASSAY W/OPTIC: CPT | Performed by: PHYSICIAN ASSISTANT

## 2018-11-16 RX ORDER — AMOXICILLIN 500 MG/1
500 CAPSULE ORAL 2 TIMES DAILY
Qty: 14 CAPSULE | Refills: 0 | Status: SHIPPED | OUTPATIENT
Start: 2018-11-16 | End: 2018-11-23

## 2018-11-16 NOTE — PROGRESS NOTES
SUBJECTIVE:   Liliana Guerra is a 38 year old female who presents to clinic today for the following health issues:      HPI  Acute Illness   Acute illness concerns: Sore throat  Onset: 1 day    Fever: no     Chills/Sweats: no     Headache (location?): no     Sinus Pressure:no    Conjunctivitis:  no    Ear Pain: no    Rhinorrhea: no     Congestion: no     Sore Throat: YES    Rash: no     Cough: no    Wheeze: no     Decreased Appetite: YES    Nausea: no     Vomiting: no     Diarrhea:  no     Dysuria/Freq.: no     Fatigue/Achiness: no    Sick/Strep Exposure: No, kids have been sick through.      Therapies Tried and outcome: Nothing tried    - Had stomach flu on Monday - had nausea, vomiting and diarrhea.  Now resolved.     Problem list and histories reviewed & adjusted, as indicated.  Additional history: as documented    Patient Active Problem List   Diagnosis   (none) - all problems resolved or deleted     Past Surgical History:   Procedure Laterality Date     NO HISTORY OF SURGERY         Social History   Substance Use Topics     Smoking status: Never Smoker     Smokeless tobacco: Never Used     Alcohol use No     History reviewed. No pertinent family history.      Current Outpatient Prescriptions   Medication Sig Dispense Refill     amoxicillin (AMOXIL) 500 MG capsule Take 1 capsule (500 mg) by mouth 2 times daily for 7 days 14 capsule 0       ROS:  Constitutional, HEENT, cardiovascular, pulmonary, gi and gu systems are negative, except as otherwise noted.    OBJECTIVE:     /58  Pulse 86  Temp 97.7  F (36.5  C) (Temporal)  Wt 191 lb (86.6 kg)  SpO2 97%  Breastfeeding? No  BMI 26.64 kg/m2  Body mass index is 26.64 kg/(m^2).  GENERAL: healthy, alert and no distress  EYES: Eyes grossly normal to inspection, PERRL and conjunctivae and sclerae normal  HENT: normal cephalic/atraumatic, ear canals and TM's normal, nose and mouth without ulcers or lesions, oral mucous membranes moist, tonsillar hypertrophy,  tonsillar erythema, tonsillar exudate and posterior oropharynx erythematous without exudates, uvula midline, soft and hard palate are normal and symmetric.   NECK: no adenopathy, no asymmetry, masses, or scars and thyroid normal to palpation  RESP: lungs clear to auscultation - no rales, rhonchi or wheezes  CV: regular rate and rhythm, normal S1 S2, no S3 or S4, no murmur, click or rub, no peripheral edema and peripheral pulses strong  MS: no gross musculoskeletal defects noted, no edema  SKIN: no suspicious lesions or rashes    Diagnostic Test Results:  Results for orders placed or performed in visit on 11/16/18 (from the past 24 hour(s))   Strep, Rapid Screen   Result Value Ref Range    Specimen Description Throat     Rapid Strep A Screen (A)      POSITIVE: Group A Streptococcal antigen detected by immunoassay.       ASSESSMENT/PLAN:       ICD-10-CM    1. Acute streptococcal pharyngitis J02.0 amoxicillin (AMOXIL) 500 MG capsule   2. Throat pain R07.0 Strep, Rapid Screen       Rapid strep is positive, will treat with amoxicillin twice per day for 7 days.  Recommended OTC therapies including salt water gargles, tylenol/ibuprofen, hot tea with honey and lemon, chloraseptic products, etc.   Follow-up if symptoms persist, worsen or new concerns.     Options for treatment and follow-up care were reviewed with the patient and/or guardian. Patient and/or guardian engaged in the decision making process and verbalized understanding of the options discussed and agreed with the final plan.      Corrina Gallagher PA-C  [facility]

## 2018-11-16 NOTE — MR AVS SNAPSHOT
After Visit Summary   11/16/2018    Liliana Guerra    MRN: [id number]           Patient Information     Date Of Birth          1980        Visit Information        Provider Department      11/16/2018 8:20 AM Corrina Gallagher PA-C [facility]        Today's Diagnoses     Acute streptococcal pharyngitis    -  1    Throat pain           Follow-ups after your visit        Follow-up notes from your care team     Return in about 3 days (around 11/19/2018) for if not improving. .      Who to contact     If you have questions or need follow up information about today's clinic visit or your schedule please contact [facility] directly at [phone].  Normal or non-critical lab and imaging results will be communicated to you by MyChart, letter or phone within 4 business days after the clinic has received the results. If you do not hear from us within 7 days, please contact the clinic through Skoodathart or phone. If you have a critical or abnormal lab result, we will notify you by phone as soon as possible.  Submit refill requests through blueKiwi Software or call your pharmacy and they will forward the refill request to us. Please allow 3 business days for your refill to be completed.          Additional Information About Your Visit        MyChart Information     blueKiwi Software gives you secure access to your electronic health record. If you see a primary care provider, you can also send messages to your care team and make appointments. If you have questions, please call your primary care clinic.  If you do not have a primary care provider, please call [phone] and they will assist you.        Care EveryWhere ID     This is your Care EveryWhere ID. This could be used by other organizations to access your [facility] medical records  [id number]        Your Vitals Were     Pulse Temperature Pulse Oximetry Breastfeeding? BMI (Body Mass Index)       86 97.7  F (36.5  C) (Temporal) 97% No 26.64  kg/m2        Blood Pressure from Last 3 Encounters:   11/16/18 108/58   10/09/17 100/60   09/11/17 98/80    Weight from Last 3 Encounters:   11/16/18 191 lb (86.6 kg)   10/09/17 204 lb 8 oz (92.8 kg)   09/11/17 210 lb (95.3 kg)              We Performed the Following     Strep, Rapid Screen          Today's Medication Changes          These changes are accurate as of 11/16/18  8:21 AM.  If you have any questions, ask your nurse or doctor.               Start taking these medicines.        Dose/Directions    amoxicillin 500 MG capsule   Commonly known as:  AMOXIL   Used for:  Acute streptococcal pharyngitis   Started by:  Corrina Gallagher PA-C        Dose:  500 mg   Take 1 capsule (500 mg) by mouth 2 times daily for 7 days   Quantity:  14 capsule   Refills:  0         Stop taking these medicines if you haven't already. Please contact your care team if you have questions.     order for DME   Stopped by:  Corrina Gallagher PA-C                Where to get your medicines      These medications were sent to [facility] [place] [id number]     Phone:  [phone]     amoxicillin 500 MG capsule                Primary Care Provider Office Phone # Fax #    Corrina Gallagher PA-C [phone]       [address] [place], [place] [id number]        Equal Access to Services     CLARIBEL [facility]ISTO : Hu hawk Somary ellen, waaxda luqadaha, qaybta kaalmada augustus, owen leonard. So [facility] [phone].    ATENCIÓN: Si habla español, tiene a dale disposición servicios gratuitos de asistencia lingüística. Yue al [phone].    We comply with applicable federal civil rights laws and [place] laws. We do not discriminate on the basis of race, color, national origin, age, disability, sex, sexual orientation, or gender identity.            Thank you!     Thank you for choosing [facility]  for your care. Our goal is always to  provide you with excellent care. Hearing back from our patients is one way we can continue to improve our services. Please take a few minutes to complete the written survey that you may receive in the mail after your visit with us. Thank you!             Your Updated Medication List - Protect others around you: Learn how to safely use, store and throw away your medicines at www.disposemymeds.org.          This list is accurate as of 11/16/18  8:21 AM.  Always use your most recent med list.                   Brand Name Dispense Instructions for use Diagnosis    amoxicillin 500 MG capsule    AMOXIL    14 capsule    Take 1 capsule (500 mg) by mouth 2 times daily for 7 days    Acute streptococcal pharyngitis

## 2018-12-17 ENCOUNTER — ALLIED HEALTH/NURSE VISIT (OUTPATIENT)
Dept: FAMILY MEDICINE | Facility: OTHER | Age: 38
End: 2018-12-17
Payer: COMMERCIAL

## 2018-12-17 DIAGNOSIS — Z23 NEED FOR PROPHYLACTIC VACCINATION AND INOCULATION AGAINST INFLUENZA: Primary | ICD-10-CM

## 2018-12-17 PROCEDURE — 90686 IIV4 VACC NO PRSV 0.5 ML IM: CPT

## 2018-12-17 PROCEDURE — 99207 ZZC NO CHARGE NURSE ONLY: CPT

## 2018-12-17 PROCEDURE — 90471 IMMUNIZATION ADMIN: CPT

## 2018-12-17 NOTE — PROGRESS NOTES

## 2020-03-02 ENCOUNTER — HEALTH MAINTENANCE LETTER (OUTPATIENT)
Age: 40
End: 2020-03-02
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
## 2020-08-18 ENCOUNTER — OFFICE VISIT (OUTPATIENT)
Dept: ORTHOPEDICS | Facility: CLINIC | Age: 40
End: 2020-08-18
Payer: COMMERCIAL

## 2020-08-18 VITALS
WEIGHT: 187.7 LBS | SYSTOLIC BLOOD PRESSURE: 104 MMHG | BODY MASS INDEX: 26.28 KG/M2 | HEIGHT: 71 IN | DIASTOLIC BLOOD PRESSURE: 64 MMHG

## 2020-08-18 DIAGNOSIS — M17.12 PATELLOFEMORAL ARTHRITIS OF LEFT KNEE: Primary | ICD-10-CM

## 2020-08-18 PROCEDURE — 99203 OFFICE O/P NEW LOW 30 MIN: CPT | Performed by: ORTHOPAEDIC SURGERY

## 2020-08-18 ASSESSMENT — MIFFLIN-ST. JEOR: SCORE: 1620.4
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
## 2020-08-19 ENCOUNTER — TELEPHONE (OUTPATIENT)
Dept: ORTHOPEDICS | Facility: CLINIC | Age: 40
End: 2020-08-19

## 2020-08-19 NOTE — TELEPHONE ENCOUNTER
8/19 Provided phone number [phone] to schedule an appointment with Dr. Rivers.     Tracy Zhang   Procedure    Ortho/Sports Med/Pod/Ent/Eye/Surgical Specialties  [facility]   [phone]

## 2020-10-04 NOTE — TELEPHONE ENCOUNTER
DIAGNOSIS: She has had previous RIGHT knee surgery, most likely was tibial tubercle osteotomy , Referred by Dr. Andriy Schulte , notes in chart and images being pushed through to [facility]   APPOINTMENT DATE: 10.20.20   NOTES STATUS DETAILS   OFFICE NOTE from referring provider Internal 8.18.20 [facility]   OFFICE NOTE from other specialist N/A    DISCHARGE SUMMARY from hospital N/A    DISCHARGE REPORT from the ER N/A    OPERATIVE REPORT In process 20 years ago   MEDICATION LIST N/A    EMG (for Spine) N/A    IMPLANT RECORD/STICKER N/A    LABS     CBC/DIFF Internal 1.30.17   CULTURES N/A    INJECTIONS DONE IN RADIOLOGY N/A    MRI N/A    CT SCAN N/A    XRAYS (IMAGES & REPORTS) N/A    TUMOR     PATHOLOGY  Slides & report N/A      Action 10.5.20 MJ   Action Taken Appt notes states right knee. [facility] Need to call pt to see if she is being for right or left knee. Too late to call this evening.    10/19/20   11:27 AM   Left knee images in PACS  Complete  Yvonne Hernandez CMA

## 2020-10-08 DIAGNOSIS — M17.12 PATELLOFEMORAL ARTHRITIS OF LEFT KNEE: Primary | ICD-10-CM

## 2020-10-08 DIAGNOSIS — M25.562 PAIN OF LEFT PATELLOFEMORAL JOINT: ICD-10-CM

## 2020-10-16 ENCOUNTER — TELEPHONE (OUTPATIENT)
Dept: ORTHOPEDICS | Facility: CLINIC | Age: 40
End: 2020-10-16

## 2020-10-16 NOTE — TELEPHONE ENCOUNTER
Writer called and left patient a detailed voice message regarding next Tuesday's appointment with Dr. Rivers. Dr. Rivers needs to be out of the office next week for a health concern. Writer offered to keep pt's appointment on Tuesday but would see Dr. Castellon. If patient would want to be moved to Dr. Velasquez's schedule the apt will be at 8:45 am. Writer did inform pt if does not wish to be seen by Dr. Castellon it will be February before there is an opening on Dr. Rivers's schedule at this time.  Pt is to call the clinic back to change to   Dr. Velasquez's or reschedule out for   Dr. Rivers.     Susie Paredes LPN

## 2020-10-19 ENCOUNTER — TELEPHONE (OUTPATIENT)
Dept: ORTHOPEDICS | Facility: CLINIC | Age: 40
End: 2020-10-19

## 2020-10-19 NOTE — TELEPHONE ENCOUNTER
Writer called and left patient a message again stating that tomorrows appointment has to be changed to Dr. Castellon versus Dr. Rivers. It is important that pt call to confirm the change by calling [phone]. Pt's apt would be changed to Dr. Castellon at 8:45am. If pt would wish to reschedule with Dr. Rivers, it will likely be after the first of the year.     Susie BOSS

## 2020-10-20 ENCOUNTER — THERAPY VISIT (OUTPATIENT)
Dept: PHYSICAL THERAPY | Facility: CLINIC | Age: 40
End: 2020-10-20
Payer: COMMERCIAL

## 2020-10-20 ENCOUNTER — PRE VISIT (OUTPATIENT)
Dept: ORTHOPEDICS | Facility: CLINIC | Age: 40
End: 2020-10-20

## 2020-10-20 ENCOUNTER — ANCILLARY PROCEDURE (OUTPATIENT)
Dept: GENERAL RADIOLOGY | Facility: CLINIC | Age: 40
End: 2020-10-20
Attending: ORTHOPAEDIC SURGERY
Payer: COMMERCIAL

## 2020-10-20 ENCOUNTER — OFFICE VISIT (OUTPATIENT)
Dept: ORTHOPEDICS | Facility: CLINIC | Age: 40
End: 2020-10-20
Payer: COMMERCIAL

## 2020-10-20 VITALS — RESPIRATION RATE: 16 BRPM | WEIGHT: 183 LBS | HEIGHT: 71 IN | BODY MASS INDEX: 25.62 KG/M2

## 2020-10-20 DIAGNOSIS — G89.29 CHRONIC PAIN OF LEFT KNEE: ICD-10-CM

## 2020-10-20 DIAGNOSIS — M25.562 CHRONIC PAIN OF LEFT KNEE: ICD-10-CM

## 2020-10-20 DIAGNOSIS — M25.562 PAIN OF LEFT PATELLOFEMORAL JOINT: ICD-10-CM

## 2020-10-20 DIAGNOSIS — M25.562 CHRONIC PAIN OF LEFT KNEE: Primary | ICD-10-CM

## 2020-10-20 DIAGNOSIS — G89.29 CHRONIC PAIN OF LEFT KNEE: Primary | ICD-10-CM

## 2020-10-20 PROCEDURE — 77073 BONE LENGTH STUDIES: CPT | Mod: GC | Performed by: RADIOLOGY

## 2020-10-20 PROCEDURE — 97530 THERAPEUTIC ACTIVITIES: CPT | Mod: GP | Performed by: PHYSICAL THERAPIST

## 2020-10-20 PROCEDURE — 97110 THERAPEUTIC EXERCISES: CPT | Mod: GP | Performed by: PHYSICAL THERAPIST

## 2020-10-20 PROCEDURE — 97161 PT EVAL LOW COMPLEX 20 MIN: CPT | Mod: GP | Performed by: PHYSICAL THERAPIST

## 2020-10-20 PROCEDURE — 73560 X-RAY EXAM OF KNEE 1 OR 2: CPT | Mod: LT | Performed by: RADIOLOGY

## 2020-10-20 PROCEDURE — 99203 OFFICE O/P NEW LOW 30 MIN: CPT | Performed by: ORTHOPAEDIC SURGERY

## 2020-10-20 ASSESSMENT — MIFFLIN-ST. JEOR: SCORE: 1599.14

## 2020-10-20 NOTE — LETTER
10/20/2020         RE: Liliana Guerra  [address]  [facility] [address]        Dear Colleague,    Thank you for referring your patient, Liliana Guerra, to the [facility] [place]. Please see a copy of my visit note below.    Initial Visit     Referring MD: Referred Self     CC: left knee pain     HPI: Liliana Guerra is a 40 year old year old female who presents with left knee pain. No AZAR. She has chronic knee pain, and she has had multiple patellar dislocations and patellar subluxations on this side and the right side. Roughly 20 y/a she had patellar stabilization surgery here in the [facility] on the right side. She notes that the recovery was a long difficult process, so she did not go through with planned surgery for her left knee. She does feel that the right knee has been more stable than the left since that time. For the left, she modified her activity to minimize instability events and has not had any events for some time. Prior to the covid pandemic she was working out regularly at the gym without problems. Since covid and gym closure, she has been running more and associated this with an increase in pain. Currently she has pain with running, activity. She has a goal of getting back to the gym and running. She has not done any treatment recently, except working with Florinda today. She is unsure of how much the taping helped today, but she is interested in trying it and other PT modalities. Currently she feels the pain, which is not severe, is the biggest thing limiting her from returning to activity, in contrast to the instability although she does still have the fear of instability.    PMH:   Patient Active Problem List   Diagnosis   (none) - all problems resolved or deleted        PSH:   Past Surgical History:   Procedure Laterality Date     NO HISTORY OF SURGERY          Medications:   No current outpatient medications on file.     No current  facility-administered medications for this visit.         Allergies: No Known Allergies     SH:   Social History     Occupational History     Not on file   Tobacco Use     Smoking status: Never Smoker     Smokeless tobacco: Never Used   Substance and Sexual Activity     Alcohol use: No     Drug use: No     Sexual activity: Yes     Partners: Male     Birth control/protection: None        ROS: General ROS: negative  Respiratory ROS: no cough, shortness of breath, or wheezing  Cardiovascular ROS: no chest pain or dyspnea on exertion     PE:   Gen: A&OX3    Knee       RIGHT   LEFT   Skin:    Intact   Intact   ROM:     0-130   0-130   Effusion:    Neg   Neg   Medial joint line tenderness: Neg   Neg   Lateral joint line tenderness: Neg   Neg   Simon:    Neg   Neg   Patella crepitus:   POS  MILD  Patella tenderness:   Neg   Neg   Lachman:    Neg   Neg   Pivot shift:    Neg   Neg   Valgus stress:   Neg   Neg   Varus stress:    Neg   Neg   Posterior drawer:   Neg   Neg   N-V     intact   intact   Hip:     nml   nml   Lower extremity edema:  Neg   Neg    Lateral Translation:  2-3Q  3-4Q  Lateral endpoint:   Present Soft  Pat. Apprehension with ROM: POS POS  Lateral retinaculum :   Normal  Tight  J sign:    Neg   POS  Hyperlaxity:    Neg   Neg     Bilateral legs:  TFA 15  Prone hip IR is 70 and ER is 15    XR: Long leg standing XR demonstrate slight valgus alignment with WB through the lateral tibial spine.    Bilateral 45-degree PA weightbearing, and Merchant views as well as a lateral of the left knee with no acute fractures or dislocations. There some degenerative changes noted, especially patellofemoral R>L.    I have personally reviewed the imaging.       Impression:   40 year old female with left chronic patellar instability and anterior knee pain     Plan:   The risks and benefits of the available surgical and non-surgical treatment options were discussed with the patient.  We talked through some of the underlying  anatomy issues that are leading to this for her.   We discussed that there may be a role for further imaging and possible surgery in the future, but that we should start with PT for strengthening, gait training, and proprioceptive exercises, to also possibly include patellar taping and other modalities, with a PF therapist.  F/U prn.      cc:   Referring provider   [unfilled]     Primary care provider   [address] [facility] [address]

## 2020-10-20 NOTE — PROGRESS NOTES
PHYSICAL THERAPIST IMPRESSION: Liliana presents to physical therapy with complaints of left knee pain. She has a history of patellar instability but has not had an event for many years. She had a patellar stabilization procedure done on the right and was able to modify her activity to avoid having the same surgery on her left. She has excellent proximal hip and core strength. She has abnormal patellar positioning as well as a soft j-sign with active knee extension and a isometric quad set. She has a trace effusion but full range of motion. She is limited in regards weight bearing knee flexion and can only SL squat to 25 degrees. She is able to increase to 35 degrees with an MPFL + MPTL tape technique as well as decrease the pinching sensation. She has done an excellent job of maintaining her fitness and strength. She was provided with quad strengthening exercises to maintain her activation and taping videos to attempt for some at home relief.     RESPONSIVENESS TO MILLARD TAPE:  Millard Taping Trial    Pre/posttest activity SL squatting   Taping technique(s) trialed MPFL + MPTL line   Numerical Pain Scale 2/10 to 1/10   Improvement in movement pattern with tape on Increased depth from 25 deg to 35 degrees        PT MODIFIABLE FACTORS PRESENT NOT PRESENT   Proximal LE/Trunk mm weakness  X   Quadriceps muscle dysfunction/weakness X    Poor postural stability  X   Poor dynamic movement patterns X    Restricted ankle DF  X   Previous PF PT Interventions  X (no PT x 20 years)     PATIENT BONY ALIGNMENT SUSPICIOUS FOR: (to be determined by MD and imaging studies):    Limb version (from hip rotation ROM only)        Physical Therapy Initial Evaluation: Subjective History     Injury/Condition Details:  Presenting Complaint Left knee pain   Onset Timing/Date March 2020   Mechanism Had instability that started in elementary school.   In college had a reconstruction surgery, was told she had arthritis already in the knee  cap. Was told she was going to have the left knee done but never did.   When COVID happened, she started running and then there is pain that has been getting worse and increasing. There is constant pain.   Has been to [facility] for evaluation, went to [facility] (send to PT), went to [facility].      Symptom Behavior Details    Primary Symptoms Constant symptoms; worsen with activity, pain (Location: behind and on the sides of the knee, feels like a pinching, Quality: Aching/Throbbing)   Worst Pain 6/10 (with See below)   Symptom Provocators Stairs   Walking (feels that the knee tenses up with walking)   Squatting (feels that she cannot do it),   Can lift her kiddos   Best Pain 2/10    Symptom Relievers Nothing   Time of day dependent? No   Recent symptom change? symptoms worsening     Prior Testing/Intervention for current condition:  Prior Tests  x-ray (2020, August)   Prior Treatment PT , Brace and Patellar taping: No  Right surgery: 2001/2002, no issues with her right knee since then. Can do all the she wants on the right leg.      Lifestyle & General Medical History:  Employment [employer], [employer]   Usual physical activities  (within past year) [employer] classes  Run   Orthopaedic history See Epic Chart   Notable medical history See Epic Chart   Patient Reported Health good       Lower Extremity Exam    Dynamic Movement Screen:  2 leg stance: Equal weight bearing, scar present on right knee, fullness of the left knee noted  2 leg squat:Reduced squat depth d/t reported pain at knee    1 leg stance:   Right: normal  Left: normal    1 leg squat:   Right: proprioceptive challenge and excessive anterior knee excursion  Left: proprioceptive challenge and excessive anterior knee excursion, 25 degrees, 1-2/10 pinching    Gait: Mild stiff leg gait pattern but not overly antalgic    Patellofemoral Joint Examination:  Test Right Left   Patellar Orientation:   90 deg flexion Mild lateral translation and patella jacky  visually observed Mild lateral translation and patella jacky visually observed   OKC Patellar Tracking Crepitus Increased lateralization into TKE and Crepitus   Patellar Orientation:  0 deg flexion Mild lateral translation and patella jacky visually observed Mild lateral translation and patella jacky visually observed   Quadrant Mobility (Med:Lat) 1:Apprehension  2:Apprehension   Effusion 0 Trace   Quad Activation Normal Depressed intensity and lateralization of the patella     Knee Joint AROM: symmetrical, mild symptoms at end range flexion    Palpation:   Tender to palpation at the following structures: medial joint line, lateral joint line and patellar tendon    Hip Joint PROM Screen   Right Left Difference   Flex 100 deg 100 deg - deg   ER 40 deg 50 deg - deg   IR 60 deg 50 deg - deg     Lower Extremity Muscle Strength (x/5)   Right Left   Hip ER 5-/5 5-/5   Hip IR 5-/5 5-/5   Hip ABD 5-/5 5-/5   Hip ADD Nt/5 NT/5   Hip Ext 5-/5 5-/5   Knee Flex 5-/5 5-/5     Lower Extremity Flexibility Screen:   Right Left   Hamstring - -   BLAINE - -   Quadriceps - -   Gastroc/ Soleus - -   - = normal  + = mild tightness  ++ = moderate tightness  +++ = significant tightness      Assessment/Plan:  Patient is a 40 year old female with left side knee complaints.    Patient has the following significant findings with corresponding treatment plan.                Diagnosis 1:  Left anterior knee pain  Pain -  hot/cold therapy, splint/taping/bracing/orthotics and home program  Decreased ROM/flexibility - therapeutic exercise, therapeutic activity and home program  Decreased strength - therapeutic exercise, therapeutic activities and home program  Impaired muscle performance - neuro re-education and home program  Decreased function - therapeutic activities and home program    Therapy Evaluation Codes:   1) History comprised of:   Personal factors that impact the plan of care:      None.    Comorbidity factors that impact the plan of care  are:      None.     Medications impacting care: None.  2) Examination of Body Systems comprised of:   Body structures and functions that impact the plan of care:      Knee.   Activity limitations that impact the plan of care are:      Running, Sports and Squatting/kneeling.  3) Clinical presentation characteristics are:   Stable/Uncomplicated.  4) Decision-Making    Low complexity using standardized patient assessment instrument and/or measureable assessment of functional outcome.  Cumulative Therapy Evaluation is: Low complexity.    Previous and current functional limitations:  (See Goal Flow Sheet for this information)    Short term and Long term goals: (See Goal Flow Sheet for this information)     Communication ability:  Patient appears to be able to clearly communicate and understand verbal and written communication and follow directions correctly.  Treatment Explanation - The following has been discussed with the patient:   RX ordered/plan of care  Anticipated outcomes  Possible risks and side effects  This patient would benefit from PT intervention to resume normal activities.   Rehab potential is good.    Frequency:  1 X week, once daily  Duration:  for 1 weeks  Discharge Plan:  Achieve all LTG.  Independent in home treatment program.  Reach maximal therapeutic benefit.    Please refer to the daily flowsheet for treatment today, total treatment time and time spent performing 1:1 timed codes.

## 2020-10-20 NOTE — PROGRESS NOTES
Initial Visit     Referring MD: Referred Self     CC: left knee pain     HPI: Liliana Guerra is a 40 year old year old female who presents with left knee pain. No AZAR. She has chronic knee pain, and she has had multiple patellar dislocations and patellar subluxations on this side and the right side. Roughly 20 y/a she had patellar stabilization surgery here in the [facility] on the right side. She notes that the recovery was a long difficult process, so she did not go through with planned surgery for her left knee. She does feel that the right knee has been more stable than the left since that time. For the left, she modified her activity to minimize instability events and has not had any events for some time. Prior to the covid pandemic she was working out regularly at the gym without problems. Since covid and gym closure, she has been running more and associated this with an increase in pain. Currently she has pain with running, activity. She has a goal of getting back to the gym and running. She has not done any treatment recently, except working with Florinda today. She is unsure of how much the taping helped today, but she is interested in trying it and other PT modalities. Currently she feels the pain, which is not severe, is the biggest thing limiting her from returning to activity, in contrast to the instability although she does still have the fear of instability.    PMH:   Patient Active Problem List   Diagnosis   (none) - all problems resolved or deleted        PSH:   Past Surgical History:   Procedure Laterality Date     NO HISTORY OF SURGERY          Medications:   No current outpatient medications on file.     No current facility-administered medications for this visit.         Allergies: No Known Allergies     SH:   Social History     Occupational History     Not on file   Tobacco Use     Smoking status: Never Smoker     Smokeless tobacco: Never Used   Substance and Sexual Activity     Alcohol use:  No     Drug use: No     Sexual activity: Yes     Partners: Male     Birth control/protection: None        ROS: General ROS: negative  Respiratory ROS: no cough, shortness of breath, or wheezing  Cardiovascular ROS: no chest pain or dyspnea on exertion     PE:   Gen: A&OX3    Knee       RIGHT   LEFT   Skin:    Intact   Intact   ROM:     0-130   0-130   Effusion:    Neg   Neg   Medial joint line tenderness: Neg   Neg   Lateral joint line tenderness: Neg   Neg   Simon:    Neg   Neg   Patella crepitus:   POS  MILD  Patella tenderness:   Neg   Neg   Lachman:    Neg   Neg   Pivot shift:    Neg   Neg   Valgus stress:   Neg   Neg   Varus stress:    Neg   Neg   Posterior drawer:   Neg   Neg   N-V     intact   intact   Hip:     nml   nml   Lower extremity edema:  Neg   Neg    Lateral Translation:  2-3Q  3-4Q  Lateral endpoint:   Present Soft  Pat. Apprehension with ROM: POS POS  Lateral retinaculum :   Normal  Tight  J sign:    Neg   POS  Hyperlaxity:    Neg   Neg     Bilateral legs:  TFA 15  Prone hip IR is 70 and ER is 15    XR: Long leg standing XR demonstrate slight valgus alignment with WB through the lateral tibial spine.    Bilateral 45-degree PA weightbearing, and Merchant views as well as a lateral of the left knee with no acute fractures or dislocations. There some degenerative changes noted, especially patellofemoral R>L.    I have personally reviewed the imaging.       Impression:   40 year old female with left chronic patellar instability and anterior knee pain     Plan:   The risks and benefits of the available surgical and non-surgical treatment options were discussed with the patient.  We talked through some of the underlying anatomy issues that are leading to this for her.   We discussed that there may be a role for further imaging and possible surgery in the future, but that we should start with PT for strengthening, gait training, and proprioceptive exercises, to also possibly include patellar taping and  other modalities, with a PF therapist.  F/U prn.      cc:   Referring provider   [unfilled]     Primary care provider   [address] [address]

## 2020-11-19 ENCOUNTER — THERAPY VISIT (OUTPATIENT)
Dept: PHYSICAL THERAPY | Facility: CLINIC | Age: 40
End: 2020-11-19
Payer: COMMERCIAL

## 2020-11-19 DIAGNOSIS — M25.562 CHRONIC PAIN OF LEFT KNEE: ICD-10-CM

## 2020-11-19 DIAGNOSIS — G89.29 CHRONIC PAIN OF LEFT KNEE: ICD-10-CM

## 2020-11-19 PROCEDURE — 97112 NEUROMUSCULAR REEDUCATION: CPT | Mod: GP | Performed by: PHYSICAL THERAPIST

## 2020-11-19 PROCEDURE — 97110 THERAPEUTIC EXERCISES: CPT | Mod: GP | Performed by: PHYSICAL THERAPIST

## 2020-11-19 PROCEDURE — 97530 THERAPEUTIC ACTIVITIES: CPT | Mod: GP | Performed by: PHYSICAL THERAPIST

## 2020-11-19 NOTE — PROGRESS NOTES
Subjective:  HPI  Physical Exam                    Objective:  System    Physical Exam    General     ROS    Assessment/Plan:    SUBJECTIVE  Subjective changes as noted by pt:  she feels it is limiting her movement and going back to the [facility] she is now getting some pain in the lateral hip too. She gets more pain with running and stairs but now that she has gotten away from that since the gyms opened up and she can do strength training. She is frustrated that she still feels unsure of her knee and also afraid of pain if she overdoes any activity.        Current pain level: 1/10     Changes in function:  Yes (See Goal flowsheet attached for changes in current functional level)     Adverse reaction to treatment or activity:  None    OBJECTIVE  Changes in objective findings:  Yes,     Severe valgus and hip drop on L>R with wt shift during sit to stand.    No gross patellar maltracking    No pain with step up/down or squat          ASSESSMENT  Liliana continues to require intervention to meet STG and LTG's: PT  Patient's symptoms are resolving.  Patient is progressing as expected.  Response to therapy has shown an improvement in  pain level, muscle control and coordination  Progress made towards STG/LTG?  Yes (See Goal flowsheet attached for updates on achievement of STG and LTG)    PLAN  Current treatment program is being advanced to more complex exercises.    PTA/ATC plan:  N/A    Please refer to the daily flowsheet for treatment today, total treatment time and time spent performing 1:1 timed codes.

## 2020-11-23 ENCOUNTER — THERAPY VISIT (OUTPATIENT)
Dept: PHYSICAL THERAPY | Facility: CLINIC | Age: 40
End: 2020-11-23
Payer: COMMERCIAL

## 2020-11-23 DIAGNOSIS — G89.29 CHRONIC PAIN OF LEFT KNEE: ICD-10-CM

## 2020-11-23 DIAGNOSIS — M25.562 CHRONIC PAIN OF LEFT KNEE: ICD-10-CM

## 2020-11-23 PROCEDURE — 97112 NEUROMUSCULAR REEDUCATION: CPT | Mod: GP | Performed by: PHYSICAL THERAPIST

## 2020-11-23 PROCEDURE — 97530 THERAPEUTIC ACTIVITIES: CPT | Mod: GP | Performed by: PHYSICAL THERAPIST

## 2020-11-23 PROCEDURE — 97110 THERAPEUTIC EXERCISES: CPT | Mod: GP | Performed by: PHYSICAL THERAPIST

## 2020-11-23 NOTE — PROGRESS NOTES
Subjective:  HPI  Physical Exam                    Objective:  System    Physical Exam    General     ROS    Assessment/Plan:    SUBJECTIVE  Subjective changes as noted by pt:  Overall doing ok--she is surprised at how weak her hips feel!       Current pain level: 0/10     Changes in function:  Yes (See Goal flowsheet attached for changes in current functional level)     Adverse reaction to treatment or activity:  None    OBJECTIVE  Changes in objective findings:  Yes, significant anterior knee excursion on R>L during single leg squat/slider squat and step down--and on L she struggles more with valgus and contralateral hip drop        ASSESSMENT  Liliana continues to require intervention to meet STG and LTG's: PT  Patient's symptoms are resolving.  Patient is progressing as expected.  Response to therapy has shown an improvement in  muscle control and coordination  Progress made towards STG/LTG?  Yes (See Goal flowsheet attached for updates on achievement of STG and LTG)    PLAN  Current treatment program is being advanced to more complex exercises.    PTA/ATC plan:  N/A    Please refer to the daily flowsheet for treatment today, total treatment time and time spent performing 1:1 timed codes.

## 2020-12-02 ENCOUNTER — TELEPHONE (OUTPATIENT)
Dept: ORTHOPEDICS | Facility: CLINIC | Age: 40
End: 2020-12-02

## 2020-12-02 NOTE — TELEPHONE ENCOUNTER
LVM stating that 12/7 and 12/21 are the only dates we have available at this time for Dr. Castellon. I explained that his January schedule hasn't been opened yet, and he will be in clinic now every other Monday. Left call back number.

## 2020-12-02 NOTE — TELEPHONE ENCOUNTER
[facility] Call Center    Phone Message    May a detailed message be left on voicemail: yes     Reason for Call: Other: Pt of Dr. Castellon calling in stating her 12/29/20 appt was canceled and needs to be rescheduled.        Pt was offered either 12/07 or 12/21 and neither of those dates work for the pt.  Dr. Castellon template is not allowing appts to show up for scheduling.    Pt stated she is available the last two weeks of Dec except Monday 12/21 and 12/28.    Please call pt back to get her scheduled     Action Taken: Message routed to:  [facility] ([facility] Sports    Travel Screening: Not Applicable

## 2020-12-09 ENCOUNTER — THERAPY VISIT (OUTPATIENT)
Dept: PHYSICAL THERAPY | Facility: CLINIC | Age: 40
End: 2020-12-09
Payer: COMMERCIAL

## 2020-12-09 DIAGNOSIS — G89.29 CHRONIC PAIN OF LEFT KNEE: ICD-10-CM

## 2020-12-09 DIAGNOSIS — M25.562 CHRONIC PAIN OF LEFT KNEE: ICD-10-CM

## 2020-12-09 PROCEDURE — 97110 THERAPEUTIC EXERCISES: CPT | Mod: GP | Performed by: PHYSICAL THERAPIST

## 2020-12-09 PROCEDURE — 97112 NEUROMUSCULAR REEDUCATION: CPT | Mod: GP | Performed by: PHYSICAL THERAPIST

## 2020-12-09 NOTE — PROGRESS NOTES
Subjective:  HPI  Physical Exam                    Objective:  System    Physical Exam    General     ROS    Assessment/Plan:    SUBJECTIVE  Subjective changes as noted by pt:  Pt reports that her SLR has been painful--creating sharp pain in her knee.       Current pain level: 0/10     Changes in function:  Yes (See Goal flowsheet attached for changes in current functional level)     Adverse reaction to treatment or activity:  None    OBJECTIVE  Changes in objective findings:  Yes,     No pain with SLR in sitting only supine, still needs excessive cuing to avoid excessive knee excursion, = WB on both sides and avoiding valgus.     Can now squat to 65 deg with great form, no support and no pain.         ASSESSMENT  Liliana continues to require intervention to meet STG and LTG's: PT  Patient's symptoms are resolving.  Patient is progressing as expected.  Response to therapy has shown an improvement in  pain level, ROM  and flexibility  Progress made towards STG/LTG?  Yes (See Goal flowsheet attached for updates on achievement of STG and LTG)    PLAN  Current treatment program is being advanced to more complex exercises.    PTA/ATC plan:  N/A    Please refer to the daily flowsheet for treatment today, total treatment time and time spent performing 1:1 timed codes.

## 2020-12-09 NOTE — TELEPHONE ENCOUNTER
C-sec has been pushed back to 11 AM due to hospital called and they needed her time pushed back. PT agreed.  Yolie Mckeon, CMA  
Left message for patient to return call to my direct # [phone] to help assisting in scheduling her .  
Surgery Scheduled    Date of Surgery 17 Time of Surgery 7:30am  Procedure: Repeat  Section  Hospital/Surgical Facility: [facility]  Surgeon: Dr Shields  Type of Anesthesia Anticipated: Spinal  Pre-Op: 17 with Dr Shields    Post-Op: 10/06/17 with Dr Shields  Pre-Certification -to be completed  Consent Signed -to be completed  Hospital Stay -inpatient procedure    Surgery Packet (and/or) Colonscopy Prep (was given/or mailed) to patient. Patient was also instructed to arrive 1 1/2 hour(s) prior to surgery.  Patient understood and agrees to the plan.      Queta Frye  Specialty    ___________________________________  Surgery Pre-Certification    Medical Record Number: [id number]  Liliana Guerra  YOB: 1980   Phone: [phone] (home) [phone] (work)  Primary Provider: No primary care provider on file.    Reason for Admit:  OB Procedure    Surgeon: Dr Shields  Surgical Procedure: Repeat  Section  ICD-9 Coded: O34.219  Date of Surgery: 17  Consent signed? No    Date signed:   Hospital: [facility]  Inpatient- Length of stay:  3 days.    Requestor:  Mallory Frye     Location:  [facility]  
no

## 2020-12-20 ENCOUNTER — HEALTH MAINTENANCE LETTER (OUTPATIENT)
Age: 40
End: 2020-12-20

## 2021-01-18 ENCOUNTER — THERAPY VISIT (OUTPATIENT)
Dept: PHYSICAL THERAPY | Facility: CLINIC | Age: 41
End: 2021-01-18
Payer: COMMERCIAL

## 2021-01-18 DIAGNOSIS — M25.562 CHRONIC PAIN OF LEFT KNEE: ICD-10-CM

## 2021-01-18 DIAGNOSIS — G89.29 CHRONIC PAIN OF LEFT KNEE: ICD-10-CM

## 2021-01-18 PROCEDURE — 97110 THERAPEUTIC EXERCISES: CPT | Mod: GP | Performed by: PHYSICAL THERAPIST

## 2021-01-18 PROCEDURE — 97530 THERAPEUTIC ACTIVITIES: CPT | Mod: GP | Performed by: PHYSICAL THERAPIST

## 2021-01-18 PROCEDURE — 97112 NEUROMUSCULAR REEDUCATION: CPT | Mod: GP | Performed by: PHYSICAL THERAPIST

## 2021-01-19 NOTE — PROGRESS NOTES
Subjective:  HPI  Physical Exam                    Objective:  System    Physical Exam    General     PROGRESS  REPORT    Progress reporting period is from 11-19-20 to 1-18-21.               SUBJECTIVE  Subjective changes as noted by pt:  She feels like she is pretty overwhelmed with work and life and hasn't been to the gym so she hasn't really tested it much other than with her exercises. She feels a little better with her squats and just technique in general.        Current pain level: 2/10     Changes in function:  Yes (See Goal flowsheet attached for changes in current functional level)     Adverse reaction to treatment or activity:  None    OBJECTIVE  Changes in objective findings:  Yes,     R knee AROM: 7-0-140  L knee AROM: 8-0-138    R knee single leg squat: 45 deg with minimal valgus/balance issues and no contralateral hip drop.    L knee single leg squat: 15 deg then drastically drops contralateral hip, demonstrates >20 deg valgus and excessive anterior knee excursion.        ASSESSMENT/PLAN  Updated problem list and treatment plan: Diagnosis 1:  L patellofemoral pain    Pain -  self management, education and home program  Decreased strength - therapeutic exercise and therapeutic activities  Impaired muscle performance - neuro re-education  Decreased function - therapeutic activities  STG/LTGs have been met or progress has been made towards goals:  Yes (See Goal flow sheet completed today.)  Assessment of Progress: The patient's condition is improving.  The patient's condition has potential to improve.  Patient is meeting short term goals and is progressing towards long term goals.  Self Management Plans:  Patient has been instructed in a home treatment program.  Patient is independent in a home treatment program.  Patient  has been instructed in self management of symptoms.  Patient is independent in self management of symptoms.  I have re-evaluated this patient and find that the nature, scope, duration  and intensity of the therapy is appropriate for the medical condition of the patient.  Liliana continues to require the following intervention to meet STG and LTG's:  PT    Recommendations:  This patient is continuing to progress with HEP and will transition into more formal workouts to see how she fares. She continues to require skilled PT to progress with her home treatment program.    Frequency: 2x/month  Duration: 2 months    Please refer to the daily flowsheet for treatment today, total treatment time and time spent performing 1:1 timed codes.

## 2021-03-23 PROBLEM — G89.29 CHRONIC PAIN OF LEFT KNEE: Status: RESOLVED | Noted: 2020-10-20 | Resolved: 2021-03-23

## 2021-03-23 PROBLEM — M25.562 CHRONIC PAIN OF LEFT KNEE: Status: RESOLVED | Noted: 2020-10-20 | Resolved: 2021-03-23

## 2021-04-18 ENCOUNTER — HEALTH MAINTENANCE LETTER (OUTPATIENT)
Age: 41
End: 2021-04-18

## 2021-07-09 ENCOUNTER — OFFICE VISIT (OUTPATIENT)
Dept: OBGYN | Facility: OTHER | Age: 41
End: 2021-07-09
Payer: COMMERCIAL

## 2021-07-09 VITALS
HEIGHT: 71 IN | BODY MASS INDEX: 26.46 KG/M2 | WEIGHT: 189 LBS | DIASTOLIC BLOOD PRESSURE: 62 MMHG | SYSTOLIC BLOOD PRESSURE: 108 MMHG

## 2021-07-09 DIAGNOSIS — Z00.00 ANNUAL PHYSICAL EXAM: Primary | ICD-10-CM

## 2021-07-09 PROCEDURE — G0145 SCR C/V CYTO,THINLAYER,RESCR: HCPCS | Performed by: OBSTETRICS & GYNECOLOGY

## 2021-07-09 PROCEDURE — 99385 PREV VISIT NEW AGE 18-39: CPT | Performed by: OBSTETRICS & GYNECOLOGY

## 2021-07-09 PROCEDURE — 87624 HPV HI-RISK TYP POOLED RSLT: CPT | Performed by: OBSTETRICS & GYNECOLOGY

## 2021-07-09 ASSESSMENT — MIFFLIN-ST. JEOR: SCORE: 1625.25

## 2021-07-09 NOTE — PATIENT INSTRUCTIONS
PREVENTIVE HEALTH RECOMMENDATIONS:   Get a physical every year.  A pap test is important to have done starting at age 21 and then every three years as long as your pap is normal.  When you receive the results of your pap test it will include when you need to have your next pap test.    You should be tested each year for chlamydia and gonorrhea if you are aged 16-25 and if you have had a new sexual partner since you were last tested.   Vaccines: Get a flu shot each year.   Eat at least 8-10 servings of fruits and vegetables daily.  Eat whole-grain bread and cereal, whole-wheat pasta and brown rice instead of white grains and white rice.   For bone health: Eat calcium-rich foods (dairy products) or take calcium pills (500 to 600 mg with vitamin D) twice a day with food.   Exercise for an average of 30 minutes a day, 5 days of the week. It can be as simple as taking a brisk walk.  This will help you control your weight and prevent many diseases.   Limit alcohol to one drink per day.   Don't smoke and limit your exposure to second hand smoke.  If you smoke consider making a plan to quit. Go to [employer] and clink on   [employer]  for help   Wear sunscreen with at least SPF15 to prevent skin damage and skin cancer.   Brush your teeth twice a day and floss once a day and see your dentist twice a year for an exam and cleaning.   Have a great year and I will look forward to seeing you next year.   Kirstie Shields, DO
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
## 2021-07-14 LAB
COPATH REPORT: NORMAL
FINAL DIAGNOSIS: NORMAL
HPV HR 12 DNA CVX QL NAA+PROBE: NEGATIVE
HPV16 DNA SPEC QL NAA+PROBE: NEGATIVE
HPV18 DNA SPEC QL NAA+PROBE: NEGATIVE
PAP: NORMAL
SPECIMEN DESCRIPTION: NORMAL
SPECIMEN SOURCE CVX/VAG CYTO: NORMAL

## 2021-10-03 ENCOUNTER — HEALTH MAINTENANCE LETTER (OUTPATIENT)
Age: 41
End: 2021-10-03

## 2022-02-17 ENCOUNTER — ANCILLARY PROCEDURE (OUTPATIENT)
Dept: MAMMOGRAPHY | Facility: CLINIC | Age: 42
End: 2022-02-17
Attending: OBSTETRICS & GYNECOLOGY
Payer: COMMERCIAL

## 2022-02-17 DIAGNOSIS — Z00.00 ANNUAL PHYSICAL EXAM: ICD-10-CM

## 2022-02-17 PROCEDURE — 77063 BREAST TOMOSYNTHESIS BI: CPT | Mod: GC | Performed by: RADIOLOGY

## 2022-02-17 PROCEDURE — 77067 SCR MAMMO BI INCL CAD: CPT | Mod: GC | Performed by: RADIOLOGY

## 2022-05-12 ENCOUNTER — OFFICE VISIT (OUTPATIENT)
Dept: OBGYN | Facility: OTHER | Age: 42
End: 2022-05-12
Payer: COMMERCIAL

## 2022-05-12 VITALS
WEIGHT: 190.25 LBS | HEART RATE: 63 BPM | DIASTOLIC BLOOD PRESSURE: 64 MMHG | BODY MASS INDEX: 26.68 KG/M2 | SYSTOLIC BLOOD PRESSURE: 96 MMHG

## 2022-05-12 DIAGNOSIS — Z30.430 ENCOUNTER FOR INSERTION OF INTRAUTERINE CONTRACEPTIVE DEVICE: Primary | ICD-10-CM

## 2022-05-12 DIAGNOSIS — Z53.8 UNSUCCESSFUL IUD INSERTION: ICD-10-CM

## 2022-05-12 PROBLEM — Z97.5 IUD (INTRAUTERINE DEVICE) IN PLACE: Status: ACTIVE | Noted: 2022-05-12

## 2022-05-12 PROBLEM — Z97.5 IUD (INTRAUTERINE DEVICE) IN PLACE: Status: RESOLVED | Noted: 2022-05-12 | Resolved: 2022-05-12

## 2022-05-12 PROCEDURE — 58300 INSERT INTRAUTERINE DEVICE: CPT | Mod: 52 | Performed by: ADVANCED PRACTICE MIDWIFE

## 2022-05-12 RX ORDER — MISOPROSTOL 100 UG/1
400 TABLET ORAL SEE ADMIN INSTRUCTIONS
Qty: 8 TABLET | Refills: 0 | Status: SHIPPED | OUTPATIENT
Start: 2022-05-12 | End: 2022-05-14

## 2022-05-12 NOTE — PATIENT INSTRUCTIONS
Take the first dose of cytotec (misoprostol) 12 hours prior to your appointment and again 4 hours before your appointment.   You may have nausea, fever, diarrhea from the cytotec.
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
## 2022-06-16 ENCOUNTER — OFFICE VISIT (OUTPATIENT)
Dept: OBGYN | Facility: OTHER | Age: 42
End: 2022-06-16
Payer: COMMERCIAL

## 2022-06-16 VITALS
DIASTOLIC BLOOD PRESSURE: 67 MMHG | SYSTOLIC BLOOD PRESSURE: 104 MMHG | WEIGHT: 193 LBS | HEART RATE: 63 BPM | BODY MASS INDEX: 27.07 KG/M2

## 2022-06-16 DIAGNOSIS — Z97.5 IUD (INTRAUTERINE DEVICE) IN PLACE: ICD-10-CM

## 2022-06-16 DIAGNOSIS — Z30.430 ENCOUNTER FOR INSERTION OF INTRAUTERINE CONTRACEPTIVE DEVICE: Primary | ICD-10-CM

## 2022-06-16 PROCEDURE — 58300 INSERT INTRAUTERINE DEVICE: CPT | Performed by: ADVANCED PRACTICE MIDWIFE

## 2022-06-16 ASSESSMENT — ENCOUNTER SYMPTOMS
NERVOUS/ANXIOUS: 0
JOINT SWELLING: 0
FEVER: 0
DIARRHEA: 0
ARTHRALGIAS: 0
CONSTIPATION: 0
HEARTBURN: 0
MYALGIAS: 0
WEAKNESS: 0
HEADACHES: 0
PALPITATIONS: 0
COUGH: 0
HEMATURIA: 0
SHORTNESS OF BREATH: 0
FREQUENCY: 0
PARESTHESIAS: 0
EYE PAIN: 0
HEMATOCHEZIA: 0
DYSURIA: 0
NAUSEA: 0
CHILLS: 0
DIZZINESS: 0
SORE THROAT: 0
ABDOMINAL PAIN: 0
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
## 2022-06-16 NOTE — PATIENT INSTRUCTIONS
What Mirena Users May Expect    What to watch for right after Mirena is placed  Some women may experience uterine cramps, bleeding, and/or dizziness during and right after Mirena is placed. To help minimize the cramps, you may taken ibuprofen 600 mg with food prior to and every 6 hours after your appointment if needed. These symptoms should improve over the next 24 hours.  Mild cramping may be present for a few days after your placement  As a follow up, you should visit your clinic once in the first 4 to 12 weeks after Mirena is placed to make sure it is in the right position. After that, Mirena can be checked once a year as part of your routine exam.    Please use a back-up method (abstinence or condoms) for 7 days after placement. Unless instructed differently at your appointment    Your periods may change  For the first 3 to 6 months, your monthly period may become irregular. You may also have frequent spotting or light bleeding. A few women have heavy bleeding during this time. After your body adjusts, the number of bleeding days is likely to decrease (but may remain irregular), and you may even find that your periods stop altogether for as long as Mirena is in place. Around the end of the third month of use, you may see up to a 75% reduction in the amount of menstrual bleeding. By one year, about 1 out of 5 users may hay have no period at all. At the end of two years, 70% have little or no bleeding. Your periods will return rapidly once Mirena is removed.     Mirena Strings  You may check your own Mirena strings by inserting a finger into the vagina and feeling the strings as they exit the cervix.  The strings will initially feel firm, like fishing line, but will soften over a few weeks.  After the strings have softened, you or your partner should not be able to feel the strings during intercourse. If your partner continues to feel the strings you can have them shortened by your provider If you can feel the  IUD, see your healthcare provider to have the position confirmed.  You may use tampons with Mirena in place.    Mirena does not protect against HIV or STDs.  Mirena does not prevent the formation of ovarian cysts.  Mirena does not typically reduce acne or cause weight gain or mood changes.    Call the clinic immediately if you:  Notice any change in the length of the strings or can feel part of the IUD  Have pain or bleeding with sex.  Have unusually heavy bleeding from the vagina.   Think you are pregnant  Have been or might have been exposed to a sexually transmitted infection  Have unusual pelvic pain, cramping or soreness in your abdomen  Have unexplained fever or chills.       Please call [facility] [phone]  if you have questions or concerns.    For more information:  [email or web address]

## 2022-09-10 ENCOUNTER — HEALTH MAINTENANCE LETTER (OUTPATIENT)
Age: 42
End: 2022-09-10

## 2022-11-14 ENCOUNTER — IMMUNIZATION (OUTPATIENT)
Dept: FAMILY MEDICINE | Facility: CLINIC | Age: 42
End: 2022-11-14
Payer: COMMERCIAL

## 2022-11-14 DIAGNOSIS — Z23 HIGH PRIORITY FOR 2019-NCOV VACCINE: Primary | ICD-10-CM

## 2022-11-14 PROCEDURE — 0134A COVID-19,PF,MODERNA BIVALENT: CPT

## 2022-11-14 PROCEDURE — 91313 COVID-19,PF,MODERNA BIVALENT: CPT

## 2023-03-31 ENCOUNTER — ANCILLARY PROCEDURE (OUTPATIENT)
Dept: MAMMOGRAPHY | Facility: CLINIC | Age: 43
End: 2023-03-31
Attending: ORTHOPAEDIC SURGERY
Payer: COMMERCIAL

## 2023-03-31 DIAGNOSIS — Z12.31 VISIT FOR SCREENING MAMMOGRAM: ICD-10-CM

## 2023-03-31 PROCEDURE — 77067 SCR MAMMO BI INCL CAD: CPT | Mod: GC | Performed by: RADIOLOGY

## 2023-03-31 PROCEDURE — 77063 BREAST TOMOSYNTHESIS BI: CPT | Mod: GC | Performed by: RADIOLOGY

## 2023-04-06 ENCOUNTER — TELEPHONE (OUTPATIENT)
Dept: ORTHOPEDICS | Facility: CLINIC | Age: 43
End: 2023-04-06
Payer: COMMERCIAL

## 2023-04-06 NOTE — TELEPHONE ENCOUNTER
ATC attempted to call Kirill Delvalle back regarding this patient call.  Was able to reach a , but Kirill was unavailable. ATC had sent a staff message internally on Epic and requested message be given to Kirill to check her staff message and respond that way instead of trying to arrange a callback through clinic call center.   understood and will forward message.    Yared Lozano, ATC

## 2023-05-01 ENCOUNTER — ANCILLARY PROCEDURE (OUTPATIENT)
Dept: MAMMOGRAPHY | Facility: CLINIC | Age: 43
End: 2023-05-01
Attending: ORTHOPAEDIC SURGERY
Payer: COMMERCIAL

## 2023-05-01 ENCOUNTER — ANCILLARY PROCEDURE (OUTPATIENT)
Dept: ULTRASOUND IMAGING | Facility: CLINIC | Age: 43
End: 2023-05-01
Attending: ORTHOPAEDIC SURGERY
Payer: COMMERCIAL

## 2023-05-01 DIAGNOSIS — R92.8 ABNORMAL MAMMOGRAM: ICD-10-CM

## 2023-05-01 PROCEDURE — G0279 TOMOSYNTHESIS, MAMMO: HCPCS | Performed by: RADIOLOGY

## 2023-05-01 PROCEDURE — 77065 DX MAMMO INCL CAD UNI: CPT | Mod: LT | Performed by: RADIOLOGY

## 2023-05-01 PROCEDURE — 76642 ULTRASOUND BREAST LIMITED: CPT | Mod: LT | Performed by: RADIOLOGY

## 2023-06-01 NOTE — TELEPHONE ENCOUNTER
Kirill from [facility] is calling on orders that needs to be signed by Dr. Castellon or a covering provider for him. Patient is eager to get order schedule.   Dermal Autograft Text: The defect edges were debeveled with a #15 scalpel blade. Given the location of the defect, shape of the defect and the proximity to free margins a dermal autograft was deemed most appropriate. Using a sterile surgical marker, the primary defect shape was transferred to the donor site. The area thus outlined was incised deep to adipose tissue with a #15 scalpel blade.  The harvested graft was then trimmed of adipose and epidermal tissue until only dermis was left.  The skin graft was then placed in the primary defect and oriented appropriately.

## 2023-10-01 ENCOUNTER — HEALTH MAINTENANCE LETTER (OUTPATIENT)
Age: 43
End: 2023-10-01

## 2023-11-22 ENCOUNTER — VIRTUAL VISIT (OUTPATIENT)
Dept: FAMILY MEDICINE | Facility: CLINIC | Age: 43
End: 2023-11-22
Payer: COMMERCIAL

## 2023-11-22 ENCOUNTER — LAB (OUTPATIENT)
Dept: LAB | Facility: OTHER | Age: 43
End: 2023-11-22
Payer: COMMERCIAL

## 2023-11-22 ENCOUNTER — NURSE TRIAGE (OUTPATIENT)
Dept: FAMILY MEDICINE | Facility: OTHER | Age: 43
End: 2023-11-22

## 2023-11-22 DIAGNOSIS — R39.9 UTI SYMPTOMS: Primary | ICD-10-CM

## 2023-11-22 DIAGNOSIS — N39.0 RECURRENT URINARY TRACT INFECTION: ICD-10-CM

## 2023-11-22 DIAGNOSIS — R39.9 UTI SYMPTOMS: ICD-10-CM

## 2023-11-22 DIAGNOSIS — N30.01 ACUTE CYSTITIS WITH HEMATURIA: Primary | ICD-10-CM

## 2023-11-22 LAB
ALBUMIN UR-MCNC: NEGATIVE MG/DL
APPEARANCE UR: CLEAR
BILIRUB UR QL STRIP: NEGATIVE
CLUE CELLS: NORMAL
COLOR UR AUTO: YELLOW
GLUCOSE UR STRIP-MCNC: NEGATIVE MG/DL
HGB UR QL STRIP: ABNORMAL
KETONES UR STRIP-MCNC: NEGATIVE MG/DL
LEUKOCYTE ESTERASE UR QL STRIP: NEGATIVE
NITRATE UR QL: NEGATIVE
PH UR STRIP: 5.5 [PH] (ref 5–7)
RBC #/AREA URNS AUTO: ABNORMAL /HPF
SP GR UR STRIP: 1.02 (ref 1–1.03)
TRICHOMONAS, WET PREP: NORMAL
UROBILINOGEN UR STRIP-ACNC: 1 E.U./DL
WBC #/AREA URNS AUTO: ABNORMAL /HPF
WBC'S/HIGH POWER FIELD, WET PREP: NORMAL
YEAST, WET PREP: NORMAL

## 2023-11-22 PROCEDURE — 81001 URINALYSIS AUTO W/SCOPE: CPT

## 2023-11-22 PROCEDURE — 99443 PR PHYSICIAN TELEPHONE EVALUATION 21-30 MIN: CPT | Mod: 93 | Performed by: FAMILY MEDICINE

## 2023-11-22 PROCEDURE — 87210 SMEAR WET MOUNT SALINE/INK: CPT

## 2023-11-22 RX ORDER — NITROFURANTOIN 25; 75 MG/1; MG/1
100 CAPSULE ORAL 2 TIMES DAILY
Qty: 10 CAPSULE | Refills: 0 | Status: SHIPPED | OUTPATIENT
Start: 2023-11-22 | End: 2023-11-27

## 2023-11-22 NOTE — PATIENT INSTRUCTIONS
Take the antibiotics until they're gone.    Let us know if not improving.      If any recurrent symptoms, we should check another urine test when you've been off antibiotics for at least 2 days.    If needed, can use pyridium (otc) for pain relief short term.  This does turn your urine orange and can affect UA results.      Be sure to drink plenty of fluids and urinate after intercourse.      Update your COVID vaccination.      Contact us or return if questions or concerns.    Have a nice day!    Dr. Mejia      To start an Evisit, login to [employer], click on Menu, and then click on E-visit (not schedule an appointment).

## 2023-11-22 NOTE — TELEPHONE ENCOUNTER
RN called patient and she states she would also like to do a wet prep. RN signed wet prep order per Dr. Mejia's below message. No further questions or concerns at this time.

## 2023-11-22 NOTE — TELEPHONE ENCOUNTER
Call from patient.   She has been treated in the past through [facility] for UTI. She states this is her 3rd in the last year and [facility] told patient she needs to be seen to have urine culture completed.     Patient reports frequency and burning with urination. Denies blood in urine, abdominal/pelvic pain, back/flank pain.     Scheduled patient for virtual visit today. Patient requesting if UA can be completed prior to visit, then discuss treatment options during appointment.    Chiquis DEEN, RN  [facility]      Reason for Disposition   Urinating more frequently than usual (i.e., frequency)    Additional Information   Negative: Shock suspected (e.g., cold/pale/clammy skin, too weak to stand, low BP, rapid pulse)   Negative: Sounds like a life-threatening emergency to the triager   Negative: Followed a female genital area injury (e.g., labia, vagina, vulva)   Negative: Followed a male genital area injury (penis, scrotum)   Negative: Vaginal discharge   Negative: Pus (white, yellow) or bloody discharge from end of penis   Negative: Pain or burning with passing urine (urination) and pregnant   Negative: Pain or burning with passing urine (urination) and female   Negative: Pain or burning with passing urine (urination) and male   Negative: Pain or itching in the vulvar area   Negative: Pain in scrotum is main symptom   Negative: Blood in the urine is main symptom   Negative: Symptoms arising from use of a urinary catheter (e.g., Coude, Scott)   Negative: Unable to urinate (or only a few drops) > 4 hours and bladder feels very full (e.g., palpable bladder or strong urge to urinate)   Negative: Decreased urination and drinking very little and dehydration suspected (e.g., dark urine, no urine > 12 hours, very dry mouth, very lightheaded)   Negative: Patient sounds very sick or weak to the triager   Negative: Fever > 100.4 F  (38.0 C)   Negative: Side (flank) or lower back pain  present   Negative: Bad or foul-smelling urine    Protocols used: Urinary Symptoms-A-OH

## 2023-11-22 NOTE — PROGRESS NOTES
"    Instructions Relayed to Patient by Virtual Roomer:     Patient is active on [employer]:   Relayed following to patient: \"It looks like you are active on [employer], are you able to join the visit this way? If not, do you need us to send you a link now or would you like your provider to send a link via text or email when they are ready to initiate the visit?\"    Reminded patient to ensure they were logged on to virtual visit by arrival time listed. Documented in appointment notes if patient had flexibility to initiate visit sooner than arrival time. If pediatric virtual visit, ensured pediatric patient along with parent/guardian will be present for video visit.     Patient offered the website [email or web address] and/or phone number to [employer] Help line: [phone]    Liliana is a 43 year old who is being evaluated via a billable telephone visit.      What phone number would you like to be contacted at? [phone]  How would you like to obtain your AVS? [email or web address]    Distant Location (provider location):  On-site    Assessment & Plan       ICD-10-CM    1. Acute cystitis with hematuria  N30.01 nitroFURantoin macrocrystal-monohydrate (MACROBID) 100 MG capsule     UA Macroscopic with reflex to Microscopic and Culture - Lab Collect      2. Recurrent urinary tract infection  N39.0            Discussed that her urinalysis does not show evidence of UTI, but this is likely related to her current antibiotic use.  Discussed that I would not recommend using leftover antibiotics in the future.  Discussed how she can more easily access care in a timely manner so that this is less of a temptation.  Discussed that she could have a bacteria that is resistant to antibiotics.  Offered to either stop antibiotics and recheck urinalysis in a couple of days versus completing a course of nitrofurantoin with a urinalysis if her symptoms do not resolve.  Happily, her wet prep does not show any evidence of vaginal " infection.    Encouraged healthy habits to help prevent UTI recurrence.  If she continues to get frequent urinary tract infections, we should consider preventative antibiotics for the future.    Portions of this note were completed using [employer] AI and/or Dragon dictation software.  If [employer] was used, patient gave verbal consent prior to the start of the visit.  Although reviewed, there may be typographical and other inadvertent errors that remain.     Ordering of each unique test  Prescription drug management  27 minutes spent by me on the date of the encounter doing chart review, history and exam, documentation and further activities per the note       Patient Instructions   Take the antibiotics until they're gone.    Let us know if not improving.      If any recurrent symptoms, we should check another urine test when you've been off antibiotics for at least 2 days.    If needed, can use pyridium (otc) for pain relief short term.  This does turn your urine orange and can affect UA results.      Be sure to drink plenty of fluids and urinate after intercourse.      Update your COVID vaccination.      Contact us or return if questions or concerns.    Have a nice day!    Dr. Mejia      To start an Evisit, login to [employer], click on Menu, and then click on E-visit (not schedule an appointment).      Sigifredo Mejia MD, MD  [facility]    Carmelina Ford is a 43 year old, presenting for the following health issues:  UTI        11/22/2023     2:46 PM   Additional Questions   Roomed by jammie   Accompanied by self       History of Present Illness       Reason for visit:  Uti        The patient is a 43-year-old female who presents via virtual visit for UTI concerns.    This is her third UTI in the last year. Her symptoms started on Monday with frequent urination and painful urination. She took some nitrofurantoin left over from a previous UTI treatment on Monday night, which has  provided some relief. Her last UTI was in 06/2023. She has had an IUD in place for 2 years. She took Tylenol the first night and then cranberry pills. She denies any fevers, chills, back pain, or abdominal pain.    Genitourinary - Female  Onset/Duration: monday  Description:   Painful urination (Dysuria): YES           Frequency: YES  Blood in urine (Hematuria): No  Delay in urine (Hesitency): No  Intensity: mild  Progression of Symptoms:  improving  Accompanying Signs & Symptoms:  Fever/chills: No  Flank pain: No  Nausea and vomiting: No  Vaginal symptoms: none  Abdominal/Pelvic Pain: No  History:   History of frequent UTI s: YES  History of kidney stones: No  Sexually Active: YES  Possibility of pregnancy: Yes  Precipitating or alleviating factors: None  Therapies tried and outcome: nitrofurantoin       Review of Systems   Constitutional, HEENT, cardiovascular, pulmonary, gi and gu systems are negative, except as otherwise noted.      Objective    Vitals - Patient Reported  Pain Score: No Pain (0)      Vitals:  No vitals were obtained today due to virtual visit.    Physical Exam   healthy, alert, and no distress  PSYCH: Alert and oriented times 3; coherent speech, normal   rate and volume, able to articulate logical thoughts, able   to abstract reason, no tangential thoughts, no hallucinations   or delusions  Her affect is normal  RESP: No cough, no audible wheezing, able to talk in full sentences  Remainder of exam unable to be completed due to telephone visits    Lab on 11/22/2023   Component Date Value Ref Range Status    Color Urine 11/22/2023 Yellow  Colorless, Straw, Light Yellow, Yellow Final    Appearance Urine 11/22/2023 Clear  Clear Final    Glucose Urine 11/22/2023 Negative  Negative mg/dL Final    Bilirubin Urine 11/22/2023 Negative  Negative Final    Ketones Urine 11/22/2023 Negative  Negative mg/dL Final    Specific Gravity Urine 11/22/2023 1.025  1.003 - 1.035 Final    Blood Urine 11/22/2023 Trace  (A)  Negative Final    pH Urine 11/22/2023 5.5  5.0 - 7.0 Final    Protein Albumin Urine 11/22/2023 Negative  Negative mg/dL Final    Urobilinogen Urine 11/22/2023 1.0  0.2, 1.0 E.U./dL Final    Nitrite Urine 11/22/2023 Negative  Negative Final    Leukocyte Esterase Urine 11/22/2023 Negative  Negative Final    Trichomonas 11/22/2023 Absent  Absent Final    Yeast 11/22/2023 Absent  Absent Final    Clue Cells 11/22/2023 Absent  Absent Final    WBCs/high power field 11/22/2023 None  None Final    RBC Urine 11/22/2023 2-5 (A)  0-2 /HPF /HPF Final    WBC Urine 11/22/2023 0-5  0-5 /HPF /HPF Final     Results for orders placed or performed in visit on 11/22/23   UA Macroscopic with reflex to Microscopic and Culture - Lab Collect     Status: Abnormal    Specimen: Urine, Clean Catch   Result Value Ref Range    Color Urine Yellow Colorless, Straw, Light Yellow, Yellow    Appearance Urine Clear Clear    Glucose Urine Negative Negative mg/dL    Bilirubin Urine Negative Negative    Ketones Urine Negative Negative mg/dL    Specific Gravity Urine 1.025 1.003 - 1.035    Blood Urine Trace (A) Negative    pH Urine 5.5 5.0 - 7.0    Protein Albumin Urine Negative Negative mg/dL    Urobilinogen Urine 1.0 0.2, 1.0 E.U./dL    Nitrite Urine Negative Negative    Leukocyte Esterase Urine Negative Negative   UA Microscopic with Reflex to Culture     Status: Abnormal   Result Value Ref Range    RBC Urine 2-5 (A) 0-2 /HPF /HPF    WBC Urine 0-5 0-5 /HPF /HPF    Narrative    Urine Culture not indicated   Wet prep - lab collect     Status: Normal    Specimen: Vagina; Swab   Result Value Ref Range    Trichomonas Absent Absent    Yeast Absent Absent    Clue Cells Absent Absent    WBCs/high power field None None     Results for orders placed or performed in visit on 11/22/23 (from the past 24 hour(s))   UA Macroscopic with reflex to Microscopic and Culture - Lab Collect    Specimen: Urine, Clean Catch   Result Value Ref Range    Color Urine Yellow  Colorless, Straw, Light Yellow, Yellow    Appearance Urine Clear Clear    Glucose Urine Negative Negative mg/dL    Bilirubin Urine Negative Negative    Ketones Urine Negative Negative mg/dL    Specific Gravity Urine 1.025 1.003 - 1.035    Blood Urine Trace (A) Negative    pH Urine 5.5 5.0 - 7.0    Protein Albumin Urine Negative Negative mg/dL    Urobilinogen Urine 1.0 0.2, 1.0 E.U./dL    Nitrite Urine Negative Negative    Leukocyte Esterase Urine Negative Negative   Wet prep - lab collect    Specimen: Vagina; Swab   Result Value Ref Range    Trichomonas Absent Absent    Yeast Absent Absent    Clue Cells Absent Absent    WBCs/high power field None None   UA Microscopic with Reflex to Culture   Result Value Ref Range    RBC Urine 2-5 (A) 0-2 /HPF /HPF    WBC Urine 0-5 0-5 /HPF /HPF    Narrative    Urine Culture not indicated               Phone call duration: 13 minutes

## 2024-02-19 ENCOUNTER — OFFICE VISIT (OUTPATIENT)
Dept: OBGYN | Facility: OTHER | Age: 44
End: 2024-02-19
Payer: COMMERCIAL

## 2024-02-19 VITALS — SYSTOLIC BLOOD PRESSURE: 107 MMHG | BODY MASS INDEX: 26.37 KG/M2 | DIASTOLIC BLOOD PRESSURE: 68 MMHG | WEIGHT: 188 LBS

## 2024-02-19 DIAGNOSIS — Z97.5 IUD (INTRAUTERINE DEVICE) IN PLACE: ICD-10-CM

## 2024-02-19 DIAGNOSIS — N39.0 URINARY TRACT INFECTION WITHOUT HEMATURIA, SITE UNSPECIFIED: ICD-10-CM

## 2024-02-19 DIAGNOSIS — Z00.00 ANNUAL PHYSICAL EXAM: Primary | ICD-10-CM

## 2024-02-19 DIAGNOSIS — L91.8 SKIN TAG: ICD-10-CM

## 2024-02-19 PROCEDURE — 88304 TISSUE EXAM BY PATHOLOGIST: CPT | Performed by: PATHOLOGY

## 2024-02-19 PROCEDURE — G0145 SCR C/V CYTO,THINLAYER,RESCR: HCPCS | Performed by: OBSTETRICS & GYNECOLOGY

## 2024-02-19 PROCEDURE — 99396 PREV VISIT EST AGE 40-64: CPT | Mod: 25 | Performed by: OBSTETRICS & GYNECOLOGY

## 2024-02-19 PROCEDURE — 11200 RMVL SKIN TAGS UP TO&INC 15: CPT | Performed by: OBSTETRICS & GYNECOLOGY

## 2024-02-19 PROCEDURE — 87624 HPV HI-RISK TYP POOLED RSLT: CPT | Performed by: OBSTETRICS & GYNECOLOGY

## 2024-02-19 RX ORDER — NITROFURANTOIN 25; 75 MG/1; MG/1
100 CAPSULE ORAL 2 TIMES DAILY
Qty: 14 CAPSULE | Refills: 1 | Status: SHIPPED | OUTPATIENT
Start: 2024-02-19
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
## 2024-02-21 LAB
BKR LAB AP GYN ADEQUACY: NORMAL
BKR LAB AP GYN INTERPRETATION: NORMAL
BKR LAB AP HPV REFLEX: NORMAL
BKR LAB AP PREVIOUS ABNORMAL: NORMAL
PATH REPORT.COMMENTS IMP SPEC: NORMAL
PATH REPORT.FINAL DX SPEC: NORMAL
PATH REPORT.GROSS SPEC: NORMAL
PATH REPORT.MICROSCOPIC SPEC OTHER STN: NORMAL
PATH REPORT.RELEVANT HX SPEC: NORMAL
PATH REPORT.RELEVANT HX SPEC: NORMAL
PHOTO IMAGE: NORMAL

## 2024-02-27 LAB
HUMAN PAPILLOMA VIRUS 16 DNA: NEGATIVE
HUMAN PAPILLOMA VIRUS 18 DNA: NEGATIVE
HUMAN PAPILLOMA VIRUS FINAL DIAGNOSIS: NORMAL
HUMAN PAPILLOMA VIRUS OTHER HR: NEGATIVE

## 2024-03-06 ENCOUNTER — OFFICE VISIT (OUTPATIENT)
Dept: FAMILY MEDICINE | Facility: OTHER | Age: 44
End: 2024-03-06
Payer: COMMERCIAL

## 2024-03-06 VITALS
SYSTOLIC BLOOD PRESSURE: 102 MMHG | DIASTOLIC BLOOD PRESSURE: 72 MMHG | WEIGHT: 185 LBS | TEMPERATURE: 97.7 F | RESPIRATION RATE: 16 BRPM | OXYGEN SATURATION: 99 % | HEART RATE: 64 BPM | HEIGHT: 70 IN | BODY MASS INDEX: 26.48 KG/M2

## 2024-03-06 DIAGNOSIS — Z13.220 ENCOUNTER FOR LIPID SCREENING FOR CARDIOVASCULAR DISEASE: ICD-10-CM

## 2024-03-06 DIAGNOSIS — Z00.00 ROUTINE GENERAL MEDICAL EXAMINATION AT A HEALTH CARE FACILITY: Primary | ICD-10-CM

## 2024-03-06 DIAGNOSIS — Z12.31 ENCOUNTER FOR SCREENING MAMMOGRAM FOR MALIGNANT NEOPLASM OF BREAST: ICD-10-CM

## 2024-03-06 DIAGNOSIS — Z11.59 NEED FOR HEPATITIS C SCREENING TEST: ICD-10-CM

## 2024-03-06 DIAGNOSIS — Z13.6 ENCOUNTER FOR LIPID SCREENING FOR CARDIOVASCULAR DISEASE: ICD-10-CM

## 2024-03-06 DIAGNOSIS — B35.1 ONYCHOMYCOSIS: ICD-10-CM

## 2024-03-06 PROCEDURE — 99396 PREV VISIT EST AGE 40-64: CPT | Performed by: FAMILY MEDICINE

## 2024-03-06 PROCEDURE — 99213 OFFICE O/P EST LOW 20 MIN: CPT | Mod: 25 | Performed by: FAMILY MEDICINE

## 2024-03-06 SDOH — HEALTH STABILITY: PHYSICAL HEALTH: ON AVERAGE, HOW MANY MINUTES DO YOU ENGAGE IN EXERCISE AT THIS LEVEL?: 40 MIN

## 2024-03-06 SDOH — HEALTH STABILITY: PHYSICAL HEALTH: ON AVERAGE, HOW MANY DAYS PER WEEK DO YOU ENGAGE IN MODERATE TO STRENUOUS EXERCISE (LIKE A BRISK WALK)?: 5 DAYS

## 2024-03-06 ASSESSMENT — SOCIAL DETERMINANTS OF HEALTH (SDOH): HOW OFTEN DO YOU GET TOGETHER WITH FRIENDS OR RELATIVES?: ONCE A WEEK

## 2024-03-06 ASSESSMENT — PAIN SCALES - GENERAL: PAINLEVEL: NO PAIN (0)

## 2024-03-06 NOTE — PROGRESS NOTES
"Preventive Care Visit  [facility]  Caro Morales MD, MD, Family Medicine  Mar 6, 2024    Assessment & Plan         ICD-10-CM    1. Routine general medical examination at a health care facility  Z00.00 Glucose      2. Onychomycosis  B35.1 OFFICE/OUTPT VISIT,AMELIA SLATER III      3. Need for hepatitis C screening test  Z11.59 Hepatitis C Screen Reflex to HCV RNA Quant and Genotype      4. Encounter for lipid screening for cardiovascular disease  Z13.220 Lipid panel reflex to direct LDL Fasting    Z13.6       5. Encounter for screening mammogram for malignant neoplasm of breast  Z12.31 MA Screen Bilateral w/Kalen          Patient is generally though is moving into her 40s and is to help monitor for any cardiovascular issues as well as she now wants to talk about her onychomycosis.  We will get some screening labs for her though she is not fasting today so she will return for this.  Also we talked about her onychomycosis and the options for treatment and she is interested in the topicals but it is too expensive for her so she will wait for these to become cheaper as she does not have any urgency for this treatment as it is not impactful for overall health.  She is not interested in oral treatments due to the impact that can have 3 of the liver    I spent a total of 26 minutes on the day of the visit.   Time spent by me doing chart review, history and exam, documentation and further activities per the note    Caro Morales MD     Patient has been advised of split billing requirements and indicates understanding: Yes          BMI  Estimated body mass index is 26.28 kg/m  as calculated from the following:    Height as of this encounter: 1.787 m (5' 10.35\").    Weight as of this encounter: 83.9 kg (185 lb).   Weight management plan: Discussed healthy diet and exercise guidelines    Counseling  Appropriate preventive services were discussed with this patient, including applicable screening as appropriate for " fall prevention, nutrition, physical activity, Tobacco-use cessation, weight loss and cognition.  Checklist reviewing preventive services available has been given to the patient.  Reviewed patient's diet, addressing concerns and/or questions.           Carmelina Ford is a 43 year old, presenting for the following:  Physical        3/6/2024     7:45 AM   Additional Questions   Roomed by chandana   Accompanied by alone         3/6/2024     7:45 AM   Patient Reported Additional Medications   Patient reports taking the following new medications none        Health Care Directive  Patient does not have a Health Care Directive or Living Will: Discussed advance care planning with patient; information given to patient to review.    HPI        3/6/2024   General Health   How would you rate your overall physical health? Good   Feel stress (tense, anxious, or unable to sleep) Not at all         3/6/2024   Nutrition   Three or more servings of calcium each day? Yes   Diet: Other   If other, please elaborate: no red meat or pork   How many servings of fruit and vegetables per day? 4 or more   How many sweetened beverages each day? 0-1         3/6/2024   Exercise   Days per week of moderate/strenous exercise 5 days   Average minutes spent exercising at this level 40 min         3/6/2024   Social Factors   Frequency of gathering with friends or relatives Once a week   Worry food won't last until get money to buy more No   Food not last or not have enough money for food? No   Do you have housing?  Yes   Are you worried about losing your housing? No   Lack of transportation? No   Unable to get utilities (heat,electricity)? No         3/6/2024   Dental   Dentist two times every year? Yes         3/6/2024   TB Screening   Were you born outside of [place]?  No         Today's PHQ-2 Score:       3/6/2024     7:41 AM   PHQ-2 ( 1999 [employer])   Q1: Little interest or pleasure in doing things 0   Q2: Feeling down, depressed or hopeless 0    PHQ-2 Score 0   Q1: Little interest or pleasure in doing things Not at all   Q2: Feeling down, depressed or hopeless Not at all   PHQ-2 Score 0           3/6/2024   Substance Use   Alcohol more than 3/day or more than 7/wk No   Do you use any other substances recreationally? No     Social History     Tobacco Use    Smoking status: Never    Smokeless tobacco: Never   Vaping Use    Vaping Use: Never used   Substance Use Topics    Alcohol use: No    Drug use: No             3/6/2024   Breast Cancer Screening   Family history of breast, colon, or ovarian cancer? No / Unknown         5/1/2023   LAST FHS-7 RESULTS   1st degree relative breast or ovarian cancer No   Any relative bilateral breast cancer No   Any male have breast cancer No   Any ONE woman have BOTH breast AND ovarian cancer No   Any woman with breast cancer before 50yrs No   2 or more relatives with breast AND/OR ovarian cancer No   2 or more relatives with breast AND/OR bowel cancer No        Mammogram Screening - Mammogram every 1-2 years updated in Health Maintenance based on mutual decision making        3/6/2024   STI Screening   New sexual partner(s) since last STI/HIV test? (!) YES declines testing     History of abnormal Pap smear: NO - age 30-65 PAP every 5 years with negative HPV co-testing recommended        Latest Ref Rng & Units 2/19/2024    10:02 AM 7/9/2021     2:46 PM 7/9/2021     2:40 PM   PAP / HPV   PAP  Negative for Intraepithelial Lesion or Malignancy (NILM)      PAP (Historical)   NIL     HPV 16 DNA Negative Negative   Negative    HPV 18 DNA Negative Negative   Negative    Other HR HPV Negative Negative   Negative      ASCVD Risk   The ASCVD Risk score ([place] LUCERO, et al., 2019) failed to calculate for the following reasons:    Cannot find a previous HDL lab    Cannot find a previous total cholesterol lab        3/6/2024   Contraception/Family Planning   Questions about contraception or family planning No        Reviewed and  "updated as needed this visit by Provider   Tobacco  Allergies  Meds  Problems  Med Hx  Surg Hx  Fam Hx                     Objective    Exam  /72   Pulse 64   Temp 97.7  F (36.5  C) (Temporal)   Resp 16   Ht 1.787 m (5' 10.35\")   Wt 83.9 kg (185 lb)   LMP  (LMP Unknown)   SpO2 99%   BMI 26.28 kg/m     Estimated body mass index is 26.28 kg/m  as calculated from the following:    Height as of this encounter: 1.787 m (5' 10.35\").    Weight as of this encounter: 83.9 kg (185 lb).    Physical Exam  GENERAL: alert and no distress  EYES: Eyes grossly normal to inspection, PERRL and conjunctivae and sclerae normal  HENT: ear canals and TM's normal, nose and mouth without ulcers or lesions  NECK: no adenopathy, no asymmetry, masses, or scars  RESP: lungs clear to auscultation - no rales, rhonchi or wheezes  CV: regular rate and rhythm, normal S1 S2, no S3 or S4, no murmur, click or rub, no peripheral edema  ABDOMEN: soft, nontender, no hepatosplenomegaly, no masses and bowel sounds normal  MS: no gross musculoskeletal defects noted, no edema  SKIN: no suspicious lesions or rashes  NEURO: Normal strength and tone, mentation intact and speech normal  PSYCH: mentation appears normal, affect normal/bright      Signed Electronically by: Caro Morales MD, MD    "

## 2024-03-06 NOTE — PATIENT INSTRUCTIONS
Preventive Care Advice   This is general advice given by our system to help you stay healthy. However, your care team may have specific advice just for you. Please talk to your care team about your preventive care needs.  Nutrition  Eat 5 or more servings of fruits and vegetables each day.  Try wheat bread, brown rice and whole grain pasta (instead of white bread, rice, and pasta).  Get enough calcium and vitamin D. Check the label on foods and aim for 100% of the RDA (recommended daily allowance).  Lifestyle  Exercise at least 150 minutes each week   (30 minutes a day, 5 days a week).  Do muscle strengthening activities 2 days a week. These help control your weight and prevent disease.  No smoking.  Wear sunscreen to prevent skin cancer.  Have a dental exam and cleaning every 6 months.  Yearly exams  See your health care team every year to talk about:  Any changes in your health.  Any medicines your care team has prescribed.  Preventive care, family planning, and ways to prevent chronic diseases.  Shots (vaccines)   HPV shots (up to age 26), if you've never had them before.  Hepatitis B shots (up to age 59), if you've never had them before.  COVID-19 shot: Get this shot when it's due.  Flu shot: Get a flu shot every year.  Tetanus shot: Get a tetanus shot every 10 years.  Pneumococcal, hepatitis A, and RSV shots: Ask your care team if you need these based on your risk.  Shingles shot (for age 50 and up).  General health tests  Diabetes screening:  Starting at age 35, Get screened for diabetes at least every 3 years.  If you are younger than age 35, ask your care team if you should be screened for diabetes.  Cholesterol test: At age 39, start having a cholesterol test every 5 years, or more often if advised.  Bone density scan (DEXA): At age 50, ask your care team if you should have this scan for osteoporosis (brittle bones).  Hepatitis C: Get tested at least once in your life.  STIs (sexually transmitted  infections)  Before age 24: Ask your care team if you should be screened for STIs.  After age 24: Get screened for STIs if you're at risk. You are at risk for STIs (including HIV) if:  You are sexually active with more than one person.  You don't use condoms every time.  You or a partner was diagnosed with a sexually transmitted infection.  If you are at risk for HIV, ask about PrEP medicine to prevent HIV.  Get tested for HIV at least once in your life, whether you are at risk for HIV or not.  Cancer screening tests  Cervical cancer screening: If you have a cervix, begin getting regular cervical cancer screening tests at age 21. Most people who have regular screenings with normal results can stop after age 65. Talk about this with your provider.  Breast cancer scan (mammogram): If you've ever had breasts, begin having regular mammograms starting at age 40. This is a scan to check for breast cancer.  Colon cancer screening: It is important to start screening for colon cancer at age 45.  Have a colonoscopy test every 10 years (or more often if you're at risk) Or, ask your provider about stool tests like a FIT test every year or Cologuard test every 3 years.  To learn more about your testing options, visit: https://www.[employer]/928577.pdf.  For help making a decision, visit: https://bit.ly/od79284.  Prostate cancer screening test: If you have a prostate and are age 55 to 69, ask your provider if you would benefit from a yearly prostate cancer screening test.  Lung cancer screening: If you are a current or former smoker age 50 to 80, ask your care team if ongoing lung cancer screenings are right for you.  For informational purposes only. Not to replace the advice of your health care provider. Copyright   2023 [employer]. All rights reserved. Clinically reviewed by the [facility] Transitions Program. [employer] 720458 - REV 01/24.    Safer Sex: Care Instructions  Overview  Safer sex is a way to  reduce your risk of getting a sexually transmitted infection (STI). It can also help prevent pregnancy.  Several products can help you practice safer sex and reduce your chance of STIs. One of the best is a condom. There are internal and external condoms. You can use a special rubber sheet (dental dam) for protection during oral sex. Disposable gloves can keep your hands from touching blood, semen, or other body fluids that can carry infections.  Remember that birth control methods such as diaphragms, IUDs, foams, and birth control pills do not stop you from getting STIs.  Follow-up care is a key part of your treatment and safety. Be sure to make and go to all appointments, and call your doctor if you are having problems. It's also a good idea to know your test results and keep a list of the medicines you take.  How can you care for yourself at home?  Think about getting vaccinated to help prevent hepatitis A, hepatitis B, and human papillomavirus (HPV). They can be spread through sex.  Use a condom every time you have sex. Use an external condom, which goes on the penis. Or use an internal condom, which goes into the vagina or anus.  Make sure you use the right size external condom. A condom that's too small can break easily. A condom that's too big can slip off during sex.  Use a new condom each time you have sex. Be careful not to poke a hole in the condom when you open the wrapper.  Don't use an internal condom and an external condom at the same time.  Never use petroleum jelly (such as Vaseline), grease, hand lotion, baby oil, or anything with oil in it. These products can make holes in the condom.  After intercourse, hold the edge of the condom as you remove it. This will help keep semen from spilling out of the condom.  Do not have sex with anyone who has symptoms of an STI, such as sores on the genitals or mouth.  Do not drink a lot of alcohol or use drugs before sex.  Limit your sex partners. Sex with one  "partner who has sex only with you can reduce your risk of getting an STI.  Don't share sex toys. But if you do share them, use a condom and clean the sex toys between each use.  Talk to your partner(s) before you have sex. Talk about what you feel comfortable with and whether you have any boundaries with sex. And find out if your partner(s) may be at risk for any STI. Keep in mind that a person may be able to spread an STI even if they do not have symptoms. You and your partner(s) may want to get tested for STIs.  Where can you learn more?  Go to https://www.[employer].net/patiented  Enter B608 in the search box to learn more about \"Safer Sex: Care Instructions.\"  Current as of: April 19, 2023               Content Version: 13.8    4760-5978 UYA100.   Care instructions adapted under license by your healthcare professional. If you have questions about a medical condition or this instruction, always ask your healthcare professional. [employer], [employer] disclaims any warranty or liability for your use of this information.      "

## 2024-04-01 ENCOUNTER — PATIENT OUTREACH (OUTPATIENT)
Dept: CARE COORDINATION | Facility: CLINIC | Age: 44
End: 2024-04-01
Payer: COMMERCIAL

## 2024-04-08 ENCOUNTER — LAB (OUTPATIENT)
Dept: LAB | Facility: OTHER | Age: 44
End: 2024-04-08
Payer: COMMERCIAL

## 2024-04-08 DIAGNOSIS — Z00.00 ROUTINE GENERAL MEDICAL EXAMINATION AT A HEALTH CARE FACILITY: ICD-10-CM

## 2024-04-08 DIAGNOSIS — Z13.6 ENCOUNTER FOR LIPID SCREENING FOR CARDIOVASCULAR DISEASE: ICD-10-CM

## 2024-04-08 DIAGNOSIS — Z11.59 NEED FOR HEPATITIS C SCREENING TEST: ICD-10-CM

## 2024-04-08 DIAGNOSIS — Z13.220 ENCOUNTER FOR LIPID SCREENING FOR CARDIOVASCULAR DISEASE: ICD-10-CM

## 2024-04-08 LAB
CHOLEST SERPL-MCNC: 183 MG/DL
FASTING STATUS PATIENT QL REPORTED: YES
FASTING STATUS PATIENT QL REPORTED: YES
GLUCOSE SERPL-MCNC: 97 MG/DL (ref 70–99)
HCV AB SERPL QL IA: NONREACTIVE
HDLC SERPL-MCNC: 51 MG/DL
LDLC SERPL CALC-MCNC: 123 MG/DL
NONHDLC SERPL-MCNC: 132 MG/DL
TRIGL SERPL-MCNC: 44 MG/DL

## 2024-04-08 PROCEDURE — 36415 COLL VENOUS BLD VENIPUNCTURE: CPT

## 2024-04-08 PROCEDURE — 86803 HEPATITIS C AB TEST: CPT

## 2024-04-08 PROCEDURE — 80061 LIPID PANEL: CPT

## 2024-04-08 PROCEDURE — 82947 ASSAY GLUCOSE BLOOD QUANT: CPT

## 2024-04-29 ENCOUNTER — PATIENT OUTREACH (OUTPATIENT)
Dept: CARE COORDINATION | Facility: CLINIC | Age: 44
End: 2024-04-29
Payer: COMMERCIAL

## 2024-05-10 ENCOUNTER — ANCILLARY PROCEDURE (OUTPATIENT)
Dept: MAMMOGRAPHY | Facility: CLINIC | Age: 44
End: 2024-05-10
Attending: FAMILY MEDICINE
Payer: COMMERCIAL

## 2024-05-10 DIAGNOSIS — Z12.31 ENCOUNTER FOR SCREENING MAMMOGRAM FOR MALIGNANT NEOPLASM OF BREAST: ICD-10-CM

## 2024-05-10 PROCEDURE — 77067 SCR MAMMO BI INCL CAD: CPT | Mod: GC | Performed by: RADIOLOGY

## 2024-05-10 PROCEDURE — 77063 BREAST TOMOSYNTHESIS BI: CPT | Mod: GC | Performed by: RADIOLOGY

## 2024-09-16 ENCOUNTER — VIRTUAL VISIT (OUTPATIENT)
Dept: URGENT CARE | Facility: CLINIC | Age: 44
End: 2024-09-16
Payer: COMMERCIAL

## 2024-09-16 DIAGNOSIS — L30.9 DERMATITIS: Primary | ICD-10-CM

## 2024-09-16 PROCEDURE — 99214 OFFICE O/P EST MOD 30 MIN: CPT | Mod: 95

## 2024-09-16 RX ORDER — TRIAMCINOLONE ACETONIDE 1 MG/G
CREAM TOPICAL 2 TIMES DAILY
Qty: 45 G | Refills: 0 | Status: SHIPPED | OUTPATIENT
Start: 2024-09-16

## 2024-09-16 NOTE — PROGRESS NOTES
Assessment & Plan     Dermatitis  Dermatitis  Doesn't look like ringworm or erythema migranes  Trial of steroid cream and anti-histaming  Not better or worse than be seen in 1 week  - triamcinolone (KENALOG) 0.1 % external cream  Dispense: 45 g; Refill: 0             No follow-ups on file.    [facility]  [facility]    Carmelina Ford is a 44 year old female who presents to [facility] urgent care clinic today for the following health issues:  No chief complaint on file.      [facility] video visit 5 minutes  Location remote  Patient cabin location    Rash  No itch  Fades during the during  Worse in the morning.  Along abd  Bikini line  Not blistery          Review of Systems        Objective    There were no vitals taken for this visit.  Physical Exam  Constitutional:       Comments: Circular rash on few locations.  No solid reddness  No blisters  Not a raised border appearance  Skin color

## 2024-10-10 ENCOUNTER — MYC MEDICAL ADVICE (OUTPATIENT)
Dept: FAMILY MEDICINE | Facility: OTHER | Age: 44
End: 2024-10-10
Payer: COMMERCIAL

## 2024-10-23 ENCOUNTER — MYC REFILL (OUTPATIENT)
Dept: OBGYN | Facility: OTHER | Age: 44
End: 2024-10-23
Payer: COMMERCIAL

## 2024-10-23 DIAGNOSIS — N39.0 URINARY TRACT INFECTION WITHOUT HEMATURIA, SITE UNSPECIFIED: ICD-10-CM

## 2024-10-23 RX ORDER — NITROFURANTOIN 25; 75 MG/1; MG/1
100 CAPSULE ORAL 2 TIMES DAILY
Qty: 14 CAPSULE | Refills: 1 | OUTPATIENT
Start: 2024-10-23

## 2024-10-23 NOTE — TELEPHONE ENCOUNTER
Requested Prescriptions   Pending Prescriptions Disp Refills    nitroFURantoin macrocrystal-monohydrate (MACROBID) 100 MG capsule 14 capsule 1     Sig: Take 1 capsule (100 mg) by mouth 2 times daily.       There is no refill protocol information for this order        Last seen: 2/19/2024    Last prescribed: 2/19/2024 for 14 caps & 1 refill     2/19/2024 OV note = Frequent UTIs after intercourse=Macrobid ordered     Mireille NEWBERRY RN -  OB/GYN group

## 2024-10-23 NOTE — TELEPHONE ENCOUNTER
Attempted to call patient back, left message to call clinic at [phone].     When pt calls back please let her know to complete an evisit via [email or web address] for UTI symptoms and treatment.     A message was sent to in clinic providers for refill request per patient's request.     Mireille NEWBERRY RN - [facility]

## 2024-10-23 NOTE — TELEPHONE ENCOUNTER
Notified pt she will need to be seen for further evaluation if requesting treatment for UTI. Last treated Feb 2024.    Routing to provider to review and close as appropriate.    Ellyn Ordaz RN on 10/23/2024 at 10:40 AM

## 2024-10-23 NOTE — TELEPHONE ENCOUNTER
Called spoke with patient to see if she had used the refill on file OR if she is needing a new prescription.  States she hasn't used the refill on file.     Patient will call pharmacy for refill.   Refill denied.     Mireille NEWBERRY RN -  OB/GYN group

## 2024-10-23 NOTE — TELEPHONE ENCOUNTER
Patient calling for macrobid refill d/t reoccurring UTI's after sexual intercourse.     Per 2/19/2024 OV note: provider notes under assessment/plan =Frequent UTIs after intercourse=Macrobid ordered     Last ordered 2/19/2024 for 14 tabs & 1 refill.   Patient has used last refill.  States provider instructed patient to call if further refills are needed.     Routing to provider to eval/auth.  Patient is aware Dr. Shields is not in clinic today and may need to do an evisit if refill is not approved.     Mireille NEWBERRY RN - [facility]

## 2024-10-24 RX ORDER — NITROFURANTOIN 25; 75 MG/1; MG/1
100 CAPSULE ORAL 2 TIMES DAILY
Qty: 14 CAPSULE | Refills: 1 | Status: SHIPPED | OUTPATIENT
Start: 2024-10-24